# Patient Record
Sex: MALE | Race: WHITE | NOT HISPANIC OR LATINO | Employment: OTHER | URBAN - METROPOLITAN AREA
[De-identification: names, ages, dates, MRNs, and addresses within clinical notes are randomized per-mention and may not be internally consistent; named-entity substitution may affect disease eponyms.]

---

## 2017-01-11 ENCOUNTER — APPOINTMENT (OUTPATIENT)
Dept: GENERAL RADIOLOGY | Facility: HOSPITAL | Age: 72
End: 2017-01-11

## 2017-01-11 ENCOUNTER — HOSPITAL ENCOUNTER (EMERGENCY)
Facility: HOSPITAL | Age: 72
Discharge: HOME | End: 2017-01-11
Attending: EMERGENCY MEDICINE | Admitting: EMERGENCY MEDICINE

## 2017-01-11 ENCOUNTER — APPOINTMENT (OUTPATIENT)
Dept: CT IMAGING | Facility: HOSPITAL | Age: 72
End: 2017-01-11

## 2017-01-11 VITALS
DIASTOLIC BLOOD PRESSURE: 88 MMHG | RESPIRATION RATE: 16 BRPM | HEART RATE: 94 BPM | HEIGHT: 69 IN | TEMPERATURE: 98.9 F | BODY MASS INDEX: 25.18 KG/M2 | OXYGEN SATURATION: 99 % | WEIGHT: 170 LBS | SYSTOLIC BLOOD PRESSURE: 172 MMHG

## 2017-01-11 DIAGNOSIS — S40.812A ABRASION OF LEFT ARM, INITIAL ENCOUNTER: ICD-10-CM

## 2017-01-11 DIAGNOSIS — S09.90XA HEAD INJURY, INITIAL ENCOUNTER: ICD-10-CM

## 2017-01-11 DIAGNOSIS — S70.02XA CONTUSION OF LEFT HIP, INITIAL ENCOUNTER: ICD-10-CM
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
## 2017-01-11 DIAGNOSIS — S80.02XA CONTUSION OF LEFT KNEE, INITIAL ENCOUNTER: ICD-10-CM

## 2017-01-11 DIAGNOSIS — S90.31XA CONTUSION OF RIGHT FOOT, INITIAL ENCOUNTER: ICD-10-CM

## 2017-01-11 DIAGNOSIS — S40.212A: ICD-10-CM

## 2017-01-11 PROCEDURE — 73562 X-RAY EXAM OF KNEE 3: CPT

## 2017-01-11 PROCEDURE — 99282 EMERGENCY DEPT VISIT SF MDM: CPT

## 2017-01-11 PROCEDURE — 25010000002 TDAP 5-2.5-18.5 LF-MCG/0.5 SUSPENSION: Performed by: EMERGENCY MEDICINE

## 2017-01-11 PROCEDURE — 73630 X-RAY EXAM OF FOOT: CPT

## 2017-01-11 PROCEDURE — 73502 X-RAY EXAM HIP UNI 2-3 VIEWS: CPT

## 2017-01-11 PROCEDURE — 90715 TDAP VACCINE 7 YRS/> IM: CPT | Performed by: EMERGENCY MEDICINE

## 2017-01-11 PROCEDURE — 72125 CT NECK SPINE W/O DYE: CPT

## 2017-01-11 PROCEDURE — 90471 IMMUNIZATION ADMIN: CPT | Performed by: EMERGENCY MEDICINE

## 2017-01-11 PROCEDURE — 70450 CT HEAD/BRAIN W/O DYE: CPT

## 2017-01-11 RX ORDER — SILDENAFIL 100 MG/1
100 TABLET, FILM COATED ORAL DAILY PRN
COMMUNITY
End: 2018-04-25

## 2017-01-11 RX ORDER — SERTRALINE HYDROCHLORIDE 100 MG/1
100 TABLET, FILM COATED ORAL DAILY
COMMUNITY
End: 2017-03-28 | Stop reason: SDUPTHER

## 2017-01-11 RX ORDER — MODAFINIL 100 MG/1
100 TABLET ORAL DAILY
COMMUNITY
End: 2017-03-28 | Stop reason: SDUPTHER

## 2017-01-11 RX ORDER — MELATONIN
1000 DAILY
COMMUNITY
End: 2019-05-22

## 2017-01-11 RX ORDER — LISINOPRIL 10 MG/1
10 TABLET ORAL DAILY
COMMUNITY
End: 2017-03-28 | Stop reason: SDUPTHER

## 2017-01-11 RX ORDER — TAMSULOSIN HYDROCHLORIDE 0.4 MG/1
1 CAPSULE ORAL DAILY
COMMUNITY
End: 2018-07-05 | Stop reason: SDUPTHER

## 2017-01-11 RX ORDER — INDOMETHACIN 50 MG/1
50 CAPSULE ORAL 3 TIMES DAILY PRN
COMMUNITY
End: 2017-03-28 | Stop reason: SDUPTHER

## 2017-01-11 RX ORDER — ALLOPURINOL 100 MG/1
100 TABLET ORAL DAILY
COMMUNITY
End: 2017-03-28 | Stop reason: SDUPTHER

## 2017-01-11 RX ORDER — HYDROCODONE BITARTRATE AND ACETAMINOPHEN 5; 325 MG/1; MG/1
1 TABLET ORAL EVERY 6 HOURS PRN
Qty: 15 TABLET | Refills: 0 | Status: SHIPPED | OUTPATIENT
Start: 2017-01-11 | End: 2017-03-28

## 2017-01-11 RX ADMIN — TETANUS TOXOID, REDUCED DIPHTHERIA TOXOID AND ACELLULAR PERTUSSIS VACCINE, ADSORBED 0.5 ML: 5; 2.5; 8; 8; 2.5 SUSPENSION INTRAMUSCULAR at 15:21

## 2017-01-11 NOTE — ED NOTES
Patient states he is on a new drug for narcolepsy, and states he got up in the middle of the night, and tried to go down the basement steps, and fell down them. Patient states he has left hip pain, and left big toe pain, right knee pain, and abrasions to his left upper arm. Patient has had gout in the past. Patient does not recall hitting his head.      Cheyanne Lam RN  01/11/17 0062

## 2017-01-15 ENCOUNTER — TELEPHONE (OUTPATIENT)
Dept: SOCIAL WORK | Facility: HOSPITAL | Age: 72
End: 2017-01-15

## 2017-02-08 NOTE — ED PROVIDER NOTES
EMERGENCY DEPARTMENT ENCOUNTER    CHIEF COMPLAINT  Chief Complaint: Fall  History given by: Patient  History limited by: la  Room Number: HALC/C  PMD: Robby Henley MD    HPI:  Pt is a 71 y.o. male who presents after a fall in the night last night where the patient awoke in the night to go to the bathroom but fell down 10 wooded steps in his basement. Denies blow to the head or LOC in the fall. He has been ambulatory since the fall. Patient reports he was disoriented by his new house and opened the door to the basement instead of the bathroom causing him to fall. He also feels the Xyrem he has taken for his narcolepsy for the past 10 years may have contributed to his disorientation. He reports it is not abnormal for him to be up in the night and be mildly disoriented. He does not take Ambien or other sleep medication. Presently he complains of mild left hip pain, right knee pain, mild neck pain, and left foot pain. No other complaints at this time. Tetanus needs updating - will be done today.    Patient is a 71 y.o. male presenting with fall.   Fall   Mechanism of injury: fall    Injury location:  Leg, foot and pelvis  Leg injury location:  R knee and L hip  Foot injury location:  L foot  Incident location:  Home  Time since incident:  12 hours  Arrived directly from scene: no    Fall:     Fall occurred:  Down stairs    Impact surface:  Stairs    Point of impact:  Unable to specify  Tetanus status:  Out of date  Prior to arrival data:     Bystander interventions:  None  Current pain details:     Pain quality:  Aching    Pain Severity:  Mild    Pain timing:  Constant  Associated symptoms: no abdominal pain, no back pain, no blindness, no chest pain, no difficulty breathing, no headaches, no hearing loss, no loss of consciousness, no nausea, no neck pain, no seizures and no vomiting          PAST MEDICAL HISTORY  Active Ambulatory Problems     Diagnosis Date Noted   • No Active Ambulatory Problems     Resolved  FOUR VIEW LEFT KNEE

2/8/17

 

INDICATION:

Left knee pain, injury.

 

FINDINGS:  

There is no fracture, dislocation or significant joint capsular distention.

 

IMPRESSION:  

No acute process.

 

POS: [facility] Ambulatory Problems     Diagnosis Date Noted   • No Resolved Ambulatory Problems     Past Medical History   Diagnosis Date   • Gout    • Hypertension    • Narcolepsy    • Prostate cancer        PAST SURGICAL HISTORY  Past Surgical History   Procedure Laterality Date   • Hernia repair     • Cholecystectomy     • Tonsillectomy         FAMILY HISTORY  History reviewed. No pertinent family history.    SOCIAL HISTORY  Social History     Social History   • Marital status:      Spouse name: N/A   • Number of children: N/A   • Years of education: N/A     Occupational History   • Not on file.     Social History Main Topics   • Smoking status: Former Smoker   • Smokeless tobacco: Not on file   • Alcohol use 8.4 oz/week     14 Shots of liquor per week   • Drug use: No   • Sexual activity: Not on file     Other Topics Concern   • Not on file     Social History Narrative   • No narrative on file       ALLERGIES  Review of patient's allergies indicates no known allergies.    REVIEW OF SYSTEMS  Review of Systems   Constitutional: Negative for chills and fever.   HENT: Negative for hearing loss and sore throat.    Eyes: Negative for blindness.   Cardiovascular: Negative for chest pain.   Gastrointestinal: Negative for abdominal pain, nausea and vomiting.   Genitourinary: Negative for dysuria.   Musculoskeletal: Negative for back pain and neck pain.        Musculoskeletal pain as in HPI   Skin: Negative for rash.   Neurological: Negative for seizures, loss of consciousness and headaches.   Psychiatric/Behavioral: The patient is not nervous/anxious.        PHYSICAL EXAM  ED Triage Vitals   Temp Heart Rate Resp BP SpO2   01/11/17 1147 01/11/17 1147 01/11/17 1147 01/11/17 1159 01/11/17 1147   98.8 °F (37.1 °C) 86 18 181/97 98 %      Temp src Heart Rate Source Patient Position BP Location FiO2 (%)   01/11/17 1147 01/11/17 1147 -- -- --   Tympanic Monitor          Physical Exam   Constitutional: He is oriented to person, place,  and time and well-developed, well-nourished, and in no distress.   HENT:   Head: Normocephalic and atraumatic.   Eyes: Pupils are equal, round, and reactive to light.   Neck: Normal range of motion. Neck supple.   Cardiovascular: Normal rate and regular rhythm.    Pulmonary/Chest: Effort normal. No respiratory distress.   Musculoskeletal: Normal range of motion.        Left shoulder: He exhibits normal range of motion and no tenderness.        Right hip: Normal.        Left hip: He exhibits tenderness (mild). He exhibits normal range of motion and normal strength.        Right knee: He exhibits normal range of motion and no swelling. Tenderness (mild) found.        Left knee: Normal.        Cervical back: Normal.        Thoracic back: Normal.        Lumbar back: Normal.        Left foot: There is tenderness (mild tenderness of the left great toe).   Neurological: He is alert and oriented to person, place, and time.   Skin: Skin is warm and dry. Bruising (left foot, right knee) noted.   There are small skin tears to his left shoulder, upper arm, and left elbow   Psychiatric: Mood and affect normal.   Nursing note and vitals reviewed.      LAB RESULTS  No results found for this or any previous visit (from the past 24 hour(s)).  I ordered the above labs and reviewed the results    RADIOLOGY  XR Foot 3+ View Left   Preliminary Result   Negative x-rays of the right knee and left foot.          XR Knee 3 View Right   Preliminary Result   Negative x-rays of the right knee and left foot.          XR Hip With or Without Pelvis 2 - 3 View Left    (Results Pending)  Negative Acute. See dictated report for further.      CT Head Without Contrast  Negative Acute. See dictated report for further.      CT Cervical Spine Without Contrast  Degenerative changes, negative Acute. See dictated report for further.         I ordered the above noted radiological studies. Interpreted by radiologist. Reviewed by me in PACS.      PROCEDURES  Procedures      PROGRESS AND CONSULTS  ED Course   Comment By Time   2:49 PM  Patient with fall.  Is in a new house and opened basement door instead of bathroom door.  Fell down steps.  CT head and neck negative.  Xrays normal.  Will discharge home.  Dress wounds.  Follow up with PMD.  Asking for pain meds.  Needs to be very careful with these.  States he is putting a lock on that door. Royce Roche MD 01/11 [id number] - Will dress wounds, update Tetanus and discharge the patient with medication for pain. Treatment plan to include limited course of prescribed controlled substance.  Risks including addiction, tolerance, sedation, benefits and alternatives presented to patient.        MEDICAL DECISION MAKING  Results were reviewed/discussed with the patient and they were also made aware of online access. Pt also made aware that some labs, such as cultures, will not be resulted during ER visit and follow up with PMD is necessary.     MDM  Number of Diagnoses or Management Options  Abrasion of left arm, initial encounter:   Abrasion of shoulder, left, initial encounter:   Contusion of left hip, initial encounter:   Contusion of left knee, initial encounter:   Contusion of right foot, initial encounter:   Fall, initial encounter:   Head injury, initial encounter:      Amount and/or Complexity of Data Reviewed  Tests in the radiology section of CPT®: ordered and reviewed    Patient Progress  Patient progress: stable         DIAGNOSIS  Final diagnoses:   Fall, initial encounter   Abrasion of shoulder, left, initial encounter   Abrasion of left arm, initial encounter   Contusion of left hip, initial encounter   Contusion of left knee, initial encounter   Contusion of right foot, initial encounter   Head injury, initial encounter       DISPOSITION  DISCHARGE    Patient discharged in stable condition.    Reviewed implications of results, diagnosis, meds, responsibility to follow up, warning signs and  symptoms of possible worsening, potential complications and reasons to return to ER.    Patient/Family voiced understanding of above instructions.    Discussed plan for discharge, as there is no emergent indication for admission.  Pt/family is agreeable and understands need for follow up and repeat testing.  Pt is aware that discharge does not mean that nothing is wrong but it indicates no emergency is present that requires admission and they must continue care with follow-up as given below or physician of their choice.     FOLLOW-UP  Robby Henley MD  [address] [facility] [address] [phone]    Schedule an appointment as soon as possible for a visit           Medication List      New Prescriptions          HYDROcodone-acetaminophen 5-325 MG per tablet   Commonly known as:  NORCO   Take 1 tablet by mouth Every 6 (Six) Hours As Needed for mild pain (1-3).           Latest Documented Vital Signs:  As of 2:54 PM  BP- (!) 181/97 HR- 86 Temp- 98.8 °F (37.1 °C) (Tympanic) O2 sat- 98%    --  Documentation assistance provided by robin Bosch for Dr Roche.  Information recorded by the scribe was done at my direction and has been verified and validated by me.     Edward Bosch  01/11/17 1700       Royce Roche MD  01/11/17 7210

## 2017-03-28 ENCOUNTER — OFFICE VISIT (OUTPATIENT)
Dept: FAMILY MEDICINE CLINIC | Facility: CLINIC | Age: 72
End: 2017-03-28

## 2017-03-28 VITALS
TEMPERATURE: 98.3 F | DIASTOLIC BLOOD PRESSURE: 70 MMHG | OXYGEN SATURATION: 95 % | HEART RATE: 75 BPM | BODY MASS INDEX: 25.87 KG/M2 | SYSTOLIC BLOOD PRESSURE: 118 MMHG | HEIGHT: 69 IN | WEIGHT: 174.7 LBS

## 2017-03-28 DIAGNOSIS — F33.41 RECURRENT MAJOR DEPRESSIVE DISORDER, IN PARTIAL REMISSION (HCC): ICD-10-CM

## 2017-03-28 DIAGNOSIS — I10 ESSENTIAL HYPERTENSION: Primary | ICD-10-CM

## 2017-03-28 DIAGNOSIS — E78.2 MIXED HYPERLIPIDEMIA: ICD-10-CM

## 2017-03-28 DIAGNOSIS — M1A.0710 CHRONIC IDIOPATHIC GOUT INVOLVING TOE OF RIGHT FOOT WITHOUT TOPHUS: ICD-10-CM

## 2017-03-28 DIAGNOSIS — G47.411 PRIMARY NARCOLEPSY WITH CATAPLEXY: ICD-10-CM

## 2017-03-28 PROBLEM — G47.33 OBSTRUCTIVE SLEEP APNEA SYNDROME: Status: ACTIVE | Noted: 2017-03-28

## 2017-03-28 PROBLEM — G47.30 SLEEP APNEA: Status: ACTIVE | Noted: 2017-03-28

## 2017-03-28 PROCEDURE — 99204 OFFICE O/P NEW MOD 45 MIN: CPT | Performed by: FAMILY MEDICINE

## 2017-03-28 RX ORDER — CHLORAL HYDRATE 500 MG
CAPSULE ORAL
COMMUNITY
End: 2021-08-31

## 2017-03-28 RX ORDER — LISINOPRIL 10 MG/1
10 TABLET ORAL DAILY
Qty: 90 TABLET | Refills: 3 | Status: SHIPPED | OUTPATIENT
Start: 2017-03-28 | End: 2018-04-25 | Stop reason: SDUPTHER

## 2017-03-28 RX ORDER — SERTRALINE HYDROCHLORIDE 100 MG/1
100 TABLET, FILM COATED ORAL DAILY
Qty: 90 TABLET | Refills: 3 | Status: SHIPPED | OUTPATIENT
Start: 2017-03-28 | End: 2018-03-23 | Stop reason: SDUPTHER

## 2017-03-28 RX ORDER — SODIUM OXYBATE 0.5 G/ML
SOLUTION ORAL
Qty: 3 BOTTLE | Refills: 1 | Status: SHIPPED | OUTPATIENT
Start: 2017-03-28 | End: 2021-08-31

## 2017-03-28 RX ORDER — MODAFINIL 100 MG/1
100 TABLET ORAL EVERY MORNING
Qty: 90 TABLET | Refills: 1 | Status: SHIPPED | OUTPATIENT
Start: 2017-03-28 | End: 2018-03-26 | Stop reason: DRUGHIGH

## 2017-03-28 RX ORDER — ALLOPURINOL 100 MG/1
200 TABLET ORAL DAILY
Qty: 180 TABLET | Refills: 3 | Status: SHIPPED | OUTPATIENT
Start: 2017-03-28 | End: 2018-04-22 | Stop reason: SDUPTHER

## 2017-03-28 RX ORDER — INDOMETHACIN 50 MG/1
50 CAPSULE ORAL 3 TIMES DAILY PRN
Qty: 30 CAPSULE | Refills: 1 | Status: SHIPPED | OUTPATIENT
Start: 2017-03-28 | End: 2019-04-22 | Stop reason: SDUPTHER

## 2017-03-28 NOTE — PROGRESS NOTES
Subjective   Jim Frances is a 71 y.o. male who is here for   Chief Complaint   Patient presents with   • Cancer     prostate cancer    • Gout   • Depression   • Hyperlipidemia   .     History of Present Illness   Hypertension: Patient here for follow-up of elevated blood pressure. He is not exercising and is adherent to low salt diet.  Blood pressure is well controlled at home. Cardiac symptoms fatigue. Patient denies chest pressure/discomfort, claudication, cough, dyspnea, exertional chest pressure/discomfort, irregular heart beat and lower extremity edema.  Cardiovascular risk factors: advanced age (older than 55 for men, 65 for women), hypertension and male gender. Use of agents associated with hypertension: amphetamines. History of target organ damage: none  A new patient here  Originally from [place]  He moved to [place].  Moved back last year  Retired   He cares for his disabled wife  Is a prostate cancer survivor here local with Dr Galeana  But cancer has come back    Also with Narcolepsy  On Xyrem at night and Provigil i am  He has already re est with his previous sleep med MD.    Needs refills.  Uses mail order  I will fill his narcolepsy meds for now.    No recent gout flairs      The following portions of the patient's history were reviewed and updated as appropriate: allergies, current medications, past family history, past medical history, past social history, past surgical history and problem list.    Review of Systems    Objective   Physical Exam   Constitutional: He appears well-developed and well-nourished.   HENT:   Mouth/Throat: Oropharynx is clear and moist.   Cardiovascular: Normal rate.    Pulmonary/Chest: Effort normal.   Neurological: He is alert.   Psychiatric: He has a normal mood and affect.   Nursing note and vitals reviewed.      Assessment/Plan   Jim was seen today for cancer, gout, depression and hyperlipidemia.    Diagnoses and all orders for this  visit:    Essential hypertension  -     lisinopril (PRINIVIL,ZESTRIL) 10 MG tablet; Take 1 tablet by mouth Daily. Indications: High Blood Pressure    Mixed hyperlipidemia    Recurrent major depressive disorder, in partial remission  -     sertraline (ZOLOFT) 100 MG tablet; Take 1 tablet by mouth Daily. Indications: Major Depressive Disorder    Chronic idiopathic gout involving toe of right foot without tophus  -     allopurinol (ZYLOPRIM) 100 MG tablet; Take 2 tablets by mouth Daily. Indications: Primary Gout  -     indomethacin (INDOCIN) 50 MG capsule; Take 1 capsule by mouth 3 (Three) Times a Day As Needed for Mild Pain (1-3). Indications: Acute Joint Inflammation in Gout    Primary narcolepsy with cataplexy  -     modafinil (PROVIGIL) 100 MG tablet; Take 1 tablet by mouth Every Morning.  -     Sodium Oxybate (XYREM) 500 MG/ML solution; 4.5 gm diluted in 60 ml of water at 11pm and 2am.      There are no Patient Instructions on file for this visit.    Medications Discontinued During This Encounter   Medication Reason   • HYDROcodone-acetaminophen (NORCO) 5-325 MG per tablet    • lisinopril (PRINIVIL,ZESTRIL) 10 MG tablet Reorder   • sertraline (ZOLOFT) 100 MG tablet Reorder   • allopurinol (ZYLOPRIM) 100 MG tablet Reorder   • modafinil (PROVIGIL) 100 MG tablet Reorder   • Sodium Oxybate (XYREM PO) Reorder   • indomethacin (INDOCIN) 50 MG capsule Reorder        Return in about 6 months (around 9/28/2017).    Dr. Robby Henley  [place], [place]

## 2017-03-30 ENCOUNTER — TELEPHONE (OUTPATIENT)
Dept: FAMILY MEDICINE CLINIC | Facility: CLINIC | Age: 72
End: 2017-03-30

## 2017-03-30 NOTE — TELEPHONE ENCOUNTER
pt called stating that his xyrem Rx needed to be called into [facility] specialty pharmacy, it was escribed to the wrong place and did not go threw. He gave me a phone number to call directly.   [phone]  I called and spoke with a pharmacist named Karen and she stated she could not take a verbal over the phone because they need a form filled out by the provider before hand. She will be faxing the form with moreinstructions on escribing assistance. I will also add the specialty pharmacy so we don't run into this issue anymore in the future. * it is under [facility] pharmacy. For future refills.

## 2017-06-16 ENCOUNTER — CLINICAL SUPPORT (OUTPATIENT)
Dept: FAMILY MEDICINE CLINIC | Facility: CLINIC | Age: 72
End: 2017-06-16

## 2017-06-16 DIAGNOSIS — R82.90 ABNORMAL FINDING IN URINE: ICD-10-CM

## 2017-06-16 DIAGNOSIS — Z01.818 PREOPERATIVE CLEARANCE: ICD-10-CM

## 2017-06-16 DIAGNOSIS — R79.1 ABNORMAL COAGULATION PROFILE: ICD-10-CM

## 2017-06-16 DIAGNOSIS — I10 ESSENTIAL HYPERTENSION: ICD-10-CM

## 2017-06-16 DIAGNOSIS — C61 PROSTATE CANCER (HCC): Primary | ICD-10-CM

## 2017-06-18 LAB
APPEARANCE UR: CLEAR
BACTERIA UR CULT: NO GROWTH
BACTERIA UR CULT: NORMAL
BILIRUB UR QL STRIP: NEGATIVE
BUN SERPL-MCNC: 19 MG/DL (ref 8–23)
BUN/CREAT SERPL: 17.4 (ref 7–25)
CALCIUM SERPL-MCNC: 9.2 MG/DL (ref 8.8–10.5)
CHLORIDE SERPL-SCNC: 100 MMOL/L (ref 98–107)
CO2 SERPL-SCNC: 31.5 MMOL/L (ref 22–29)
COLOR UR: YELLOW
CONV COMMENT: NORMAL
CREAT SERPL-MCNC: 1.09 MG/DL (ref 0.76–1.27)
ERYTHROCYTE [DISTWIDTH] IN BLOOD BY AUTOMATED COUNT: 13 % (ref 11.5–14.5)
GLUCOSE SERPL-MCNC: 99 MG/DL (ref 65–99)
GLUCOSE UR QL: NEGATIVE
HCT VFR BLD AUTO: 39.9 % (ref 42–52)
HGB BLD-MCNC: 13.2 G/DL (ref 14–18)
HGB UR QL STRIP: NEGATIVE
INR PPP: 0.98 (ref 0.9–1.1)
KETONES UR QL STRIP: NEGATIVE
LEUKOCYTE ESTERASE UR QL STRIP: NEGATIVE
MCH RBC QN AUTO: 30.6 PG (ref 27–31)
MCHC RBC AUTO-ENTMCNC: 33.1 G/DL (ref 31–37)
MCV RBC AUTO: 92.6 FL (ref 80–94)
NITRITE UR QL STRIP: NEGATIVE
PH UR STRIP: 7.5 [PH] (ref 4.5–8)
PLATELET # BLD AUTO: 180 10*3/MM3 (ref 140–500)
POTASSIUM SERPL-SCNC: 4.6 MMOL/L (ref 3.5–5.2)
PROT UR QL STRIP: NEGATIVE
PROTHROMBIN TIME: 13 SECONDS (ref 12.1–15)
PSA SERPL-MCNC: 4.54 NG/ML (ref 0–4)
RBC # BLD AUTO: 4.31 10*6/MM3 (ref 4.7–6.1)
SODIUM SERPL-SCNC: 144 MMOL/L (ref 136–145)
SP GR UR: 1.02 (ref 1–1.03)
TESTOST FREE SERPL-MCNC: 10.2 PG/ML (ref 6.6–18.1)
TESTOST SERPL-MCNC: 418 NG/DL (ref 348–1197)
UROBILINOGEN UR STRIP-MCNC: NORMAL MG/DL
WBC # BLD AUTO: 4.13 10*3/MM3 (ref 4.8–10.8)

## 2017-08-16 ENCOUNTER — HOSPITAL ENCOUNTER (EMERGENCY)
Facility: HOSPITAL | Age: 72
Discharge: HOME | End: 2017-08-16
Attending: EMERGENCY MEDICINE | Admitting: EMERGENCY MEDICINE

## 2017-08-16 VITALS
WEIGHT: 170 LBS | BODY MASS INDEX: 25.18 KG/M2 | RESPIRATION RATE: 18 BRPM | DIASTOLIC BLOOD PRESSURE: 70 MMHG | OXYGEN SATURATION: 97 % | TEMPERATURE: 97.6 F | HEIGHT: 69 IN | SYSTOLIC BLOOD PRESSURE: 120 MMHG | HEART RATE: 50 BPM

## 2017-08-16 DIAGNOSIS — R33.8 ACUTE URINARY RETENTION: Primary | ICD-10-CM

## 2017-08-16 LAB
ALBUMIN SERPL-MCNC: 4.3 G/DL (ref 3.5–5.2)
ALBUMIN/GLOB SERPL: 1.1 G/DL
ALP SERPL-CCNC: 81 U/L (ref 39–117)
ALT SERPL W P-5'-P-CCNC: 14 U/L (ref 1–41)
ANION GAP SERPL CALCULATED.3IONS-SCNC: 13.1 MMOL/L
AST SERPL-CCNC: 19 U/L (ref 1–40)
BASOPHILS # BLD AUTO: 0.02 10*3/MM3 (ref 0–0.2)
BASOPHILS NFR BLD AUTO: 0.2 % (ref 0–1.5)
BILIRUB SERPL-MCNC: 0.5 MG/DL (ref 0.1–1.2)
BUN BLD-MCNC: 25 MG/DL (ref 8–23)
BUN/CREAT SERPL: 25.8 (ref 7–25)
CALCIUM SPEC-SCNC: 9.9 MG/DL (ref 8.6–10.5)
CHLORIDE SERPL-SCNC: 100 MMOL/L (ref 98–107)
CO2 SERPL-SCNC: 28.9 MMOL/L (ref 22–29)
CREAT BLD-MCNC: 0.97 MG/DL (ref 0.76–1.27)
DEPRECATED RDW RBC AUTO: 42.5 FL (ref 37–54)
EOSINOPHIL # BLD AUTO: 0.06 10*3/MM3 (ref 0–0.7)
EOSINOPHIL NFR BLD AUTO: 0.6 % (ref 0.3–6.2)
ERYTHROCYTE [DISTWIDTH] IN BLOOD BY AUTOMATED COUNT: 12.3 % (ref 11.5–14.5)
GFR SERPL CREATININE-BSD FRML MDRD: 76 ML/MIN/1.73
GLOBULIN UR ELPH-MCNC: 3.9 GM/DL
GLUCOSE BLD-MCNC: 122 MG/DL (ref 65–99)
HCT VFR BLD AUTO: 36.6 % (ref 40.4–52.2)
HGB BLD-MCNC: 11.9 G/DL (ref 13.7–17.6)
IMM GRANULOCYTES # BLD: 0 10*3/MM3 (ref 0–0.03)
IMM GRANULOCYTES NFR BLD: 0 % (ref 0–0.5)
LYMPHOCYTES # BLD AUTO: 1.92 10*3/MM3 (ref 0.9–4.8)
LYMPHOCYTES NFR BLD AUTO: 19.7 % (ref 19.6–45.3)
MCH RBC QN AUTO: 31.2 PG (ref 27–32.7)
MCHC RBC AUTO-ENTMCNC: 32.5 G/DL (ref 32.6–36.4)
MCV RBC AUTO: 95.8 FL (ref 79.8–96.2)
MONOCYTES # BLD AUTO: 0.69 10*3/MM3 (ref 0.2–1.2)
MONOCYTES NFR BLD AUTO: 7.1 % (ref 5–12)
NEUTROPHILS # BLD AUTO: 7.05 10*3/MM3 (ref 1.9–8.1)
NEUTROPHILS NFR BLD AUTO: 72.4 % (ref 42.7–76)
PLATELET # BLD AUTO: 234 10*3/MM3 (ref 140–500)
PMV BLD AUTO: 9.9 FL (ref 6–12)
POTASSIUM BLD-SCNC: 4.1 MMOL/L (ref 3.5–5.2)
PROT SERPL-MCNC: 8.2 G/DL (ref 6–8.5)
RBC # BLD AUTO: 3.82 10*6/MM3 (ref 4.6–6)
SODIUM BLD-SCNC: 142 MMOL/L (ref 136–145)
WBC NRBC COR # BLD: 9.74 10*3/MM3 (ref 4.5–10.7)

## 2017-08-16 PROCEDURE — 80053 COMPREHEN METABOLIC PANEL: CPT | Performed by: EMERGENCY MEDICINE

## 2017-08-16 PROCEDURE — 85025 COMPLETE CBC W/AUTO DIFF WBC: CPT | Performed by: EMERGENCY MEDICINE

## 2017-08-16 PROCEDURE — 96365 THER/PROPH/DIAG IV INF INIT: CPT

## 2017-08-16 PROCEDURE — 25010000002 MORPHINE PER 10 MG: Performed by: EMERGENCY MEDICINE

## 2017-08-16 PROCEDURE — 96375 TX/PRO/DX INJ NEW DRUG ADDON: CPT

## 2017-08-16 PROCEDURE — 99284 EMERGENCY DEPT VISIT MOD MDM: CPT

## 2017-08-16 PROCEDURE — 25010000002 CEFTRIAXONE PER 250 MG: Performed by: UROLOGY

## 2017-08-16 PROCEDURE — 25010000002 ONDANSETRON PER 1 MG: Performed by: EMERGENCY MEDICINE

## 2017-08-16 PROCEDURE — 51702 INSERT TEMP BLADDER CATH: CPT

## 2017-08-16 RX ORDER — SILODOSIN 4 MG/1
8 CAPSULE ORAL
COMMUNITY
End: 2017-12-08 | Stop reason: ALTCHOICE

## 2017-08-16 RX ORDER — CEFTRIAXONE SODIUM 1 G/50ML
1 INJECTION, SOLUTION INTRAVENOUS ONCE
Status: COMPLETED | OUTPATIENT
Start: 2017-08-16 | End: 2017-08-16

## 2017-08-16 RX ORDER — SODIUM CHLORIDE 0.9 % (FLUSH) 0.9 %
10 SYRINGE (ML) INJECTION AS NEEDED
Status: DISCONTINUED | OUTPATIENT
Start: 2017-08-16 | End: 2017-08-16 | Stop reason: HOSPADM

## 2017-08-16 RX ORDER — FESOTERODINE FUMARATE 4 MG/1
4 TABLET, EXTENDED RELEASE ORAL DAILY
COMMUNITY
End: 2018-04-25

## 2017-08-16 RX ORDER — METHENAMINE, SODIUM PHOSPHATE, MONOBASIC, MONOHYDRATE, PHENYL SALICYLATE, METHYLENE BLUE, AND HYOSCYAMINE SULFATE 120; 40.8; 36; 10; .12 MG/1; MG/1; MG/1; MG/1; MG/1
118 CAPSULE ORAL 2 TIMES DAILY
COMMUNITY
End: 2018-04-25

## 2017-08-16 RX ORDER — ONDANSETRON 2 MG/ML
4 INJECTION INTRAMUSCULAR; INTRAVENOUS ONCE
Status: COMPLETED | OUTPATIENT
Start: 2017-08-16 | End: 2017-08-16

## 2017-08-16 RX ADMIN — LIDOCAINE HYDROCHLORIDE: 20 JELLY TOPICAL at 05:43

## 2017-08-16 RX ADMIN — MORPHINE SULFATE 4 MG: 4 INJECTION, SOLUTION INTRAMUSCULAR; INTRAVENOUS at 06:21

## 2017-08-16 RX ADMIN — CEFTRIAXONE SODIUM 1 G: 1 INJECTION, SOLUTION INTRAVENOUS at 07:18

## 2017-08-16 RX ADMIN — ONDANSETRON 4 MG: 2 INJECTION INTRAMUSCULAR; INTRAVENOUS at 06:21

## 2017-08-16 NOTE — ED PROVIDER NOTES
EMERGENCY DEPARTMENT ENCOUNTER    CHIEF COMPLAINT  Chief Complaint: urinary retention  History given by: pt  History limited by: none  Room Number: 31/31  PMD: Robby Henley MD      HPI:  Pt is a 71 y.o. male with a h/o prostate cancer who presents complaining of urinary retention for 24 hours. Pt had a HIFU on 06/28 and was sent home with a catheter. Pt was able to produce urine frequently but it was just a scant amount. On reevaluation, pt was instructed to self-cath but sx worsened during this time.      He does not complain of other sx at this time. Pt drinks about 2 drinks a day and smoke 1 cigar a week.     Duration:  24 hours  Onset: gradual  Timing: constant  Intensity/Severity: moderate  Progression: unchanged  Associated Symptoms: none stated  Aggravating Factors: none stated  Alleviating Factors: none stated  Previous Episodes: Pt has a h/o prostate cancer  Treatment before arrival: Pt uses a catheter    PAST MEDICAL HISTORY  Active Ambulatory Problems     Diagnosis Date Noted   • Prostate cancer    • Narcolepsy    • Essential hypertension    • Chronic idiopathic gout involving toe of right foot    • Recurrent major depressive disorder, in partial remission 03/28/2017   • Mixed hyperlipidemia 03/28/2017   • Obstructive sleep apnea syndrome 03/28/2017     Resolved Ambulatory Problems     Diagnosis Date Noted   • No Resolved Ambulatory Problems     Past Medical History:   Diagnosis Date   • Gout    • Hypertension    • Narcolepsy    • Prostate cancer        PAST SURGICAL HISTORY  Past Surgical History:   Procedure Laterality Date   • CHOLECYSTECTOMY     • HERNIA REPAIR     • PROSTATE SURGERY     • TONSILLECTOMY         FAMILY HISTORY  Family History   Problem Relation Age of Onset   • Heart disease Mother    • Diabetes Mother    • Hypertension Mother    • Arthritis Mother    • Stroke Father    • Alcohol abuse Father    • Cancer Brother    • Leukemia Brother        SOCIAL HISTORY  Social History      Social History   • Marital status:      Spouse name: N/A   • Number of children: 1   • Years of education: N/A     Occupational History   •       Social History Main Topics   • Smoking status: Light Tobacco Smoker     Types: Cigars   • Smokeless tobacco: Not on file      Comment: 1 per mo   • Alcohol use 8.4 oz/week     14 Shots of liquor per week      Comment: 2 drinks a day    • Drug use: No   • Sexual activity: Not Currently     Partners: Female     Other Topics Concern   • Not on file     Social History Narrative   • No narrative on file       ALLERGIES  Review of patient's allergies indicates no known allergies.    REVIEW OF SYSTEMS  Review of Systems   Constitutional: Negative.    Cardiovascular: Negative.    Gastrointestinal: Negative.    Genitourinary: Positive for decreased urine volume and difficulty urinating. Negative for dysuria and urgency.       PHYSICAL EXAM  ED Triage Vitals   Temp Heart Rate Resp BP SpO2   08/16/17 0233 08/16/17 0233 08/16/17 0233 08/16/17 0308 08/16/17 0233   97.6 °F (36.4 °C) 101 16 134/73 100 %      Temp src Heart Rate Source Patient Position BP Location FiO2 (%)   08/16/17 0233 -- 08/16/17 0308 08/16/17 0308 --   Tympanic  Standing Right arm        Physical Exam   Constitutional: He is oriented to person, place, and time. He appears distressed (moderate).   Pt is in obvious pain   HENT:   Head: Normocephalic and atraumatic.   Cardiovascular: Normal rate and regular rhythm.    Pulmonary/Chest: Effort normal and breath sounds normal. No respiratory distress.   Lungs CTAB   Abdominal: Soft. Bowel sounds are normal. He exhibits distension (bladder up until umbilicus).   Genitourinary:   Genitourinary Comments: Pt is circumcised with descended testicles. There is no blood in the meatus.    Musculoskeletal: He exhibits no edema.   Neurological: He is alert and oriented to person, place, and time.   Skin: Skin is warm and dry.       LAB RESULTS  Lab  Results (last 24 hours)     Procedure Component Value Units Date/Time    CBC & Differential [[id number]] Collected:  08/16/17 0550    Specimen:  Blood Updated:  08/16/17 0601    Narrative:       The following orders were created for panel order CBC & Differential.  Procedure                               Abnormality         Status                     ---------                               -----------         ------                     CBC Auto Differential[[id number]]        Abnormal            Final result                 Please view results for these tests on the individual orders.    Comprehensive Metabolic Panel [[id number]]  (Abnormal) Collected:  08/16/17 0550    Specimen:  Blood Updated:  08/16/17 0619     Glucose 122 (H) mg/dL      BUN 25 (H) mg/dL      Creatinine 0.97 mg/dL      Sodium 142 mmol/L      Potassium 4.1 mmol/L      Chloride 100 mmol/L      CO2 28.9 mmol/L      Calcium 9.9 mg/dL      Total Protein 8.2 g/dL      Albumin 4.30 g/dL      ALT (SGPT) 14 U/L      AST (SGOT) 19 U/L      Alkaline Phosphatase 81 U/L      Total Bilirubin 0.5 mg/dL      eGFR Non African Amer 76 mL/min/1.73      Globulin 3.9 gm/dL      A/G Ratio 1.1 g/dL      BUN/Creatinine Ratio 25.8 (H)     Anion Gap 13.1 mmol/L     Narrative:       The MDRD GFR formula is only valid for adults with stable renal function between ages 18 and 70.    CBC Auto Differential [[id number]]  (Abnormal) Collected:  08/16/17 0550    Specimen:  Blood Updated:  08/16/17 0601     WBC 9.74 10*3/mm3      RBC 3.82 (L) 10*6/mm3      Hemoglobin 11.9 (L) g/dL      Hematocrit 36.6 (L) %      MCV 95.8 fL      MCH 31.2 pg      MCHC 32.5 (L) g/dL      RDW 12.3 %      RDW-SD 42.5 fl      MPV 9.9 fL      Platelets 234 10*3/mm3      Neutrophil % 72.4 %      Lymphocyte % 19.7 %      Monocyte % 7.1 %      Eosinophil % 0.6 %      Basophil % 0.2 %      Immature Grans % 0.0 %      Neutrophils, Absolute 7.05 10*3/mm3      Lymphocytes, Absolute 1.92 10*3/mm3      Monocytes,  Absolute 0.69 10*3/mm3      Eosinophils, Absolute 0.06 10*3/mm3      Basophils, Absolute 0.02 10*3/mm3      Immature Grans, Absolute 0.00 10*3/mm3           I ordered the above labs and reviewed the results      PROCEDURES  Procedures      PROGRESS AND CONSULTS  ED Course   0518: Ordered UA for further evaluation.    0548: Ordered CBC and CMP per protocol. Ordered xylocaine for pain.     0615: Our nursing staff have attempted several suprapubic catheters including a 14 [place] without success. Ordered zofran for nausea and morphine for pain.    0625: Discussed patient's case with Dr. Laird, urologist , including concerns regarding pt's 24 hour urinary retention and lack of success with catheter insertion in the ER. He will let Dr. Miller (pt's urologist) know.     0641: Dr. Miller is here for consult.     0710: Dr Miller has successfully placed a poole after performing a cystoscopy.  He states that it is safe to discharge patient to home.     MEDICAL DECISION MAKING  Results were reviewed/discussed with the patient and they were also made aware of online access. Pt also made aware that some labs, such as cultures, will not be resulted during ER visit and follow up with PMD is necessary.     MDM  Number of Diagnoses or Management Options     Amount and/or Complexity of Data Reviewed  Clinical lab tests: ordered and reviewed (Lab results are unremarkable)  Decide to obtain previous medical records or to obtain history from someone other than the patient: yes  Review and summarize past medical records: yes    Patient Progress  Patient progress: stable         DIAGNOSIS  Final diagnoses:   Acute urinary retention       DISPOSITION  Home    Latest Documented Vital Signs:  As of 6:53 AM  BP- 166/40 HR- 50 Temp- 97.6 °F (36.4 °C) (Tympanic) O2 sat- 100%    --  Documentation assistance provided by robin Blanco for Dr. Peguero.  Information recorded by the robin was done at my direction and has been verified and  validated by me.     Silva Blanco  08/16/17 0658       Gaetano Peguero MD  08/16/17 0718

## 2017-08-16 NOTE — ED NOTES
Leg bag placed, education performed. Patient states he had one for more than two weeks and understands.      Josselyn Stout RN  08/16/17 0819

## 2017-08-16 NOTE — ED TRIAGE NOTES
Pt states he has been unable to urinate. Had HIFU procedure June 28th, 2017 and has been self cathing PRN. Patient's attempt to self cath was unsuccessful with no urine return and believes there is blockage. Has had no relief and rates pain 10/10.

## 2017-08-16 NOTE — ED NOTES
Multiple attempts to insert poole cath, MD Peguero notified.     Itzel Avendano RN  08/16/17 6058

## 2017-08-17 ENCOUNTER — TELEPHONE (OUTPATIENT)
Dept: SOCIAL WORK | Facility: HOSPITAL | Age: 72
End: 2017-08-17

## 2017-09-20 DIAGNOSIS — C61 PROSTATE CANCER (HCC): Primary | ICD-10-CM

## 2017-09-20 DIAGNOSIS — E78.2 MIXED HYPERLIPIDEMIA: ICD-10-CM

## 2017-09-20 DIAGNOSIS — I10 ESSENTIAL HYPERTENSION: ICD-10-CM

## 2017-09-20 DIAGNOSIS — R53.83 FATIGUE, UNSPECIFIED TYPE: ICD-10-CM

## 2017-09-21 ENCOUNTER — CLINICAL SUPPORT (OUTPATIENT)
Dept: FAMILY MEDICINE CLINIC | Facility: CLINIC | Age: 72
End: 2017-09-21

## 2017-09-21 DIAGNOSIS — Z23 IMMUNIZATION DUE: Primary | ICD-10-CM

## 2017-09-21 LAB
ALBUMIN SERPL-MCNC: 4 G/DL (ref 3.5–5.2)
ALBUMIN/GLOB SERPL: 1.2 G/DL
ALP SERPL-CCNC: 90 U/L (ref 40–129)
ALT SERPL-CCNC: 10 U/L (ref 5–41)
APPEARANCE UR: ABNORMAL
AST SERPL-CCNC: 17 U/L (ref 5–40)
BACTERIA #/AREA URNS HPF: ABNORMAL /HPF
BILIRUB SERPL-MCNC: 0.4 MG/DL (ref 0.2–1.2)
BILIRUB UR QL STRIP: NEGATIVE
BUN SERPL-MCNC: 17 MG/DL (ref 8–23)
BUN/CREAT SERPL: 16.3 (ref 7–25)
CALCIUM SERPL-MCNC: 9.3 MG/DL (ref 8.8–10.5)
CHLORIDE SERPL-SCNC: 96 MMOL/L (ref 98–107)
CHOLEST SERPL-MCNC: 155 MG/DL (ref 0–200)
CO2 SERPL-SCNC: 31.6 MMOL/L (ref 22–29)
COLOR UR: YELLOW
CREAT SERPL-MCNC: 1.04 MG/DL (ref 0.76–1.27)
EPI CELLS #/AREA URNS HPF: ABNORMAL /HPF
ERYTHROCYTE [DISTWIDTH] IN BLOOD BY AUTOMATED COUNT: 13.6 % (ref 11.5–14.5)
GLOBULIN SER CALC-MCNC: 3.3 GM/DL
GLUCOSE SERPL-MCNC: 104 MG/DL (ref 65–99)
GLUCOSE UR QL: NEGATIVE
HCT VFR BLD AUTO: 37.1 % (ref 42–52)
HDLC SERPL-MCNC: 51 MG/DL (ref 40–60)
HGB BLD-MCNC: 11.9 G/DL (ref 14–18)
HGB UR QL STRIP: ABNORMAL
KETONES UR QL STRIP: NEGATIVE
LDLC SERPL CALC-MCNC: 81 MG/DL (ref 0–100)
LDLC/HDLC SERPL: 1.59 {RATIO}
LEUKOCYTE ESTERASE UR QL STRIP: ABNORMAL
MCH RBC QN AUTO: 30.4 PG (ref 27–31)
MCHC RBC AUTO-ENTMCNC: 32.1 G/DL (ref 31–37)
MCV RBC AUTO: 94.9 FL (ref 80–94)
NITRITE UR QL STRIP: POSITIVE
PH UR STRIP: 7 [PH] (ref 4.5–8)
PLATELET # BLD AUTO: 220 10*3/MM3 (ref 140–500)
POTASSIUM SERPL-SCNC: 4.4 MMOL/L (ref 3.5–5.2)
PROT SERPL-MCNC: 7.3 G/DL (ref 6–8.5)
PROT UR QL STRIP: ABNORMAL
PSA SERPL-MCNC: 0.05 NG/ML (ref 0–4)
RBC # BLD AUTO: 3.91 10*6/MM3 (ref 4.7–6.1)
RBC #/AREA URNS HPF: ABNORMAL /HPF
SODIUM SERPL-SCNC: 139 MMOL/L (ref 136–145)
SP GR UR: 1.02 (ref 1–1.03)
TRIGL SERPL-MCNC: 115 MG/DL (ref 0–150)
TSH SERPL DL<=0.005 MIU/L-ACNC: 2.04 MIU/ML (ref 0.27–4.2)
UROBILINOGEN UR STRIP-MCNC: ABNORMAL MG/DL
VLDLC SERPL CALC-MCNC: 23 MG/DL (ref 8–32)
WBC # BLD AUTO: 5.46 10*3/MM3 (ref 4.8–10.8)
WBC #/AREA URNS HPF: ABNORMAL /HPF

## 2017-09-21 PROCEDURE — G0008 ADMIN INFLUENZA VIRUS VAC: HCPCS | Performed by: FAMILY MEDICINE

## 2017-09-22 RX ORDER — CIPROFLOXACIN 500 MG/1
500 TABLET, FILM COATED ORAL 2 TIMES DAILY
Qty: 20 TABLET | Refills: 0 | Status: SHIPPED | OUTPATIENT
Start: 2017-09-22 | End: 2017-12-08

## 2017-09-28 ENCOUNTER — OFFICE VISIT (OUTPATIENT)
Dept: FAMILY MEDICINE CLINIC | Facility: CLINIC | Age: 72
End: 2017-09-28

## 2017-09-28 VITALS
HEART RATE: 64 BPM | BODY MASS INDEX: 24.14 KG/M2 | TEMPERATURE: 97.5 F | SYSTOLIC BLOOD PRESSURE: 140 MMHG | WEIGHT: 163 LBS | OXYGEN SATURATION: 97 % | DIASTOLIC BLOOD PRESSURE: 86 MMHG | HEIGHT: 69 IN

## 2017-09-28 DIAGNOSIS — I10 ESSENTIAL HYPERTENSION: ICD-10-CM

## 2017-09-28 DIAGNOSIS — F33.41 RECURRENT MAJOR DEPRESSIVE DISORDER, IN PARTIAL REMISSION (HCC): ICD-10-CM

## 2017-09-28 DIAGNOSIS — E78.2 MIXED HYPERLIPIDEMIA: ICD-10-CM

## 2017-09-28 DIAGNOSIS — M1A.0710 CHRONIC IDIOPATHIC GOUT INVOLVING TOE OF RIGHT FOOT WITHOUT TOPHUS: Primary | ICD-10-CM

## 2017-09-28 PROCEDURE — 99213 OFFICE O/P EST LOW 20 MIN: CPT | Performed by: FAMILY MEDICINE

## 2017-09-28 NOTE — PROGRESS NOTES
Chief Complaint   Patient presents with   • Follow-up     Lab results    • Hypertension   • Hyperlipidemia       Subjective     Patient here for follow-up of elevated blood pressure.    He is not exercising and is adherent to a low-salt diet.    Blood pressure is well controlled at home.   Cardiac symptoms: none.   Patient denies: chest pressure/discomfort, claudication, cough, dyspnea, exertional chest pressure/discomfort, fatigue, irregular heart beat and lower extremity edema.   Cardiovascular risk factors: advanced age (older than 55 for men, 65 for women), hypertension and male gender.   Use of agents associated with hypertension: amphetamines.   History of target organ damage: none.  Patient is taking prescribed hypertension medications as prescribed without side effects.    Having prostate surgery tomorrow  Having gross hematuria  On Cipro now  Has White cath inserted at this time.    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    The following portions of the patient's history were reviewed and updated as appropriate: allergies, current medications, past family history, past medical history, past social history, past surgical history and problem list.    Review of Systems  A comprehensive review of systems was negative except for: Constitutional: positive for fatigue and weight loss  Genitourinary: positive for sexual problems, decreased stream and hematuria    Results for orders placed or performed in visit on 09/20/17   PSA   Result Value Ref Range    PSA 0.046 0.000 - 4.000 ng/mL   Comprehensive metabolic panel   Result Value Ref Range    Glucose 104 (H) 65 - 99 mg/dL    BUN 17 8 - 23 mg/dL    Creatinine 1.04 0.76 - 1.27 mg/dL    eGFR Non African Am 70 >60 mL/min/1.73    eGFR African Am 85 >60 mL/min/1.73    BUN/Creatinine Ratio 16.3 7.0 - 25.0    Sodium 139 136 - 145 mmol/L    Potassium 4.4 3.5 - 5.2 mmol/L    Chloride 96 (L) 98 - 107  "mmol/L    Total CO2 31.6 (H) 22.0 - 29.0 mmol/L    Calcium 9.3 8.8 - 10.5 mg/dL    Total Protein 7.3 6.0 - 8.5 g/dL    Albumin 4.00 3.50 - 5.20 g/dL    Globulin 3.3 gm/dL    A/G Ratio 1.2 g/dL    Total Bilirubin 0.4 0.2 - 1.2 mg/dL    Alkaline Phosphatase 90 40 - 129 U/L    AST (SGOT) 17 5 - 40 U/L    ALT (SGPT) 10 5 - 41 U/L   CBC (No Diff)   Result Value Ref Range    WBC 5.46 4.80 - 10.80 10*3/mm3    RBC 3.91 (L) 4.70 - 6.10 10*6/mm3    Hemoglobin 11.9 (L) 14.0 - 18.0 g/dL    Hematocrit 37.1 (L) 42.0 - 52.0 %    MCV 94.9 (H) 80.0 - 94.0 fL    MCH 30.4 27.0 - 31.0 pg    MCHC 32.1 31.0 - 37.0 g/dL    RDW 13.6 11.5 - 14.5 %    Platelets 220 140 - 500 10*3/mm3   TSH   Result Value Ref Range    TSH 2.040 0.270 - 4.200 mIU/mL   Urinalysis With / Microscopic If Indicated   Result Value Ref Range    Specific Gravity, UA 1.020 1.003 - 1.030    pH, UA 7.0 4.5 - 8.0    Color, UA Yellow     Appearance, UA Cloudy (A) Clear    Leukocytes, UA See below: (A) Negative    Protein See below: (A) Negative    Glucose, UA Negative Negative    Ketones Negative     Blood, UA See below: (A) Negative    Bilirubin, UA Negative Negative    Urobilinogen, UA Comment     Nitrite, UA Positive (A) Negative   Lipid Panel With LDL / HDL Ratio   Result Value Ref Range    Total Cholesterol 155 0 - 200 mg/dL    Triglycerides 115 0 - 150 mg/dL    HDL Cholesterol 51 40 - 60 mg/dL    VLDL Cholesterol 23 8 - 32 mg/dL    LDL Cholesterol  81 0 - 100 mg/dL    LDL/HDL Ratio 1.59    Microscopic Examination   Result Value Ref Range    WBC, UA See below: (A) None Seen /hpf    RBC, UA 21-30 (A) None Seen /hpf    Epithelial Cells (non renal) Comment /hpf    Bacteria, UA 1+ (A) None Seen /hpf        Vitals:    09/28/17 0904   BP: 140/86   BP Location: Left arm   Patient Position: Sitting   Pulse: 64   Temp: 97.5 °F (36.4 °C)   SpO2: 97%   Weight: 163 lb (73.9 kg)   Height: 69\" (175.3 cm)     Objective    Gen: alert, pleasant.  HEENT: PERRL, EOMI intact, lids ok, " ear canals clear, TMs normal, throat clear, nostrils normal  Neck: no bruit, no enlarged thyroid  Lungs: CTA  Heart: RR no murmur  ABD: soft , + BS  Pulses: intact  Mood: stable     Assessment/Plan   Hypertension, normal blood pressure Evidence of target organ damage: none.    Jim was seen today for follow-up, hypertension and hyperlipidemia.    Diagnoses and all orders for this visit:    Chronic idiopathic gout involving toe of right foot without tophus    Essential hypertension    Mixed hyperlipidemia    Recurrent major depressive disorder, in partial remission      Medication: no change.  Follow up: 6 months and as needed.    There are no Patient Instructions on file for this visit.  There are no discontinued medications.     No Follow-up on file.    Limit salt  Limit alcoholic drinks to 1 a day  Limit caffeine to 1-2 servings a day    Dr. Robby Henley MD  [place], [place]  [facility].

## 2017-12-08 ENCOUNTER — OFFICE VISIT (OUTPATIENT)
Dept: FAMILY MEDICINE CLINIC | Facility: CLINIC | Age: 72
End: 2017-12-08

## 2017-12-08 VITALS
HEIGHT: 69 IN | OXYGEN SATURATION: 99 % | DIASTOLIC BLOOD PRESSURE: 84 MMHG | RESPIRATION RATE: 18 BRPM | WEIGHT: 174 LBS | HEART RATE: 60 BPM | SYSTOLIC BLOOD PRESSURE: 152 MMHG | BODY MASS INDEX: 25.77 KG/M2

## 2017-12-08 DIAGNOSIS — R53.83 FATIGUE, UNSPECIFIED TYPE: Primary | ICD-10-CM

## 2017-12-08 DIAGNOSIS — D64.9 ANEMIA, UNSPECIFIED TYPE: ICD-10-CM

## 2017-12-08 DIAGNOSIS — J06.9 ACUTE URI: ICD-10-CM

## 2017-12-08 LAB
BASOPHILS # BLD AUTO: 0.03 10*3/MM3 (ref 0–0.2)
BASOPHILS NFR BLD AUTO: 0.5 % (ref 0–2)
EOSINOPHIL # BLD AUTO: 0.16 10*3/MM3 (ref 0.1–0.3)
EOSINOPHIL NFR BLD AUTO: 2.7 % (ref 0–4)
ERYTHROCYTE [DISTWIDTH] IN BLOOD BY AUTOMATED COUNT: 13.9 % (ref 11.5–14.5)
HCT VFR BLD AUTO: 41.9 % (ref 42–52)
HGB BLD-MCNC: 13.5 G/DL (ref 14–18)
IMM GRANULOCYTES # BLD: 0.02 10*3/MM3 (ref 0–0.03)
IMM GRANULOCYTES NFR BLD: 0.3 % (ref 0–0.5)
IRON SATN MFR SERPL: 24 %
IRON SERPL-MCNC: 71 MCG/DL (ref 59–158)
LYMPHOCYTES # BLD AUTO: 1.55 10*3/MM3 (ref 0.6–4.8)
LYMPHOCYTES NFR BLD AUTO: 26.3 % (ref 20–45)
MCH RBC QN AUTO: 30 PG (ref 27–31)
MCHC RBC AUTO-ENTMCNC: 32.2 G/DL (ref 31–37)
MCV RBC AUTO: 93.1 FL (ref 80–94)
MONOCYTES # BLD AUTO: 0.38 10*3/MM3 (ref 0–1)
MONOCYTES NFR BLD AUTO: 6.4 % (ref 3–8)
NEUTROPHILS # BLD AUTO: 3.76 10*3/MM3 (ref 1.5–8.3)
NEUTROPHILS NFR BLD AUTO: 63.8 % (ref 45–70)
NRBC BLD AUTO-RTO: 0 /100 WBC (ref 0–0)
PLATELET # BLD AUTO: 198 10*3/MM3 (ref 140–500)
RBC # BLD AUTO: 4.5 10*6/MM3 (ref 4.7–6.1)
TIBC SERPL-MCNC: 292 MCG/DL (ref 261–498)
UIBC SERPL-MCNC: 221 MCG/DL (ref 112–346)
WBC # BLD AUTO: 5.9 10*3/MM3 (ref 4.8–10.8)

## 2017-12-08 PROCEDURE — 99213 OFFICE O/P EST LOW 20 MIN: CPT | Performed by: NURSE PRACTITIONER

## 2017-12-08 RX ORDER — SOLIFENACIN SUCCINATE 5 MG/1
5 TABLET, FILM COATED ORAL DAILY
COMMUNITY
End: 2018-10-31

## 2017-12-08 NOTE — PROGRESS NOTES
Subjective   Jim Frances is a 72 y.o. male.     Chief Complaint   Patient presents with   • Sinusitis     headache, runny nose, x 3 days. would also like iron level checked     Social History   Substance Use Topics   • Smoking status: Light Tobacco Smoker     Types: Cigars   • Smokeless tobacco: Not on file      Comment: 1 per mo   • Alcohol use 8.4 oz/week     14 Shots of liquor per week      Comment: 2 drinks a day        HPI Comments: Dr Henley had done some labs a few months ago and had low iron. Would like it rechecked. Still fatigued.     URI    This is a new problem. The current episode started in the past 7 days (3 days). The problem has been unchanged. There has been no fever. Associated symptoms include congestion, coughing (non-productive), rhinorrhea, sneezing and a sore throat (dry/scratchy). Pertinent negatives include no ear pain, plugged ear sensation, sinus pain or wheezing. Treatments tried: nyquil, mucinex. The treatment provided mild relief.     Review of Systems   Constitutional: Positive for fatigue. Negative for chills and fever.   HENT: Positive for congestion, postnasal drip, rhinorrhea, sneezing and sore throat (dry/scratchy). Negative for ear pain, sinus pain and sinus pressure.    Respiratory: Positive for cough (non-productive). Negative for wheezing.    Neurological: Negative.    Psychiatric/Behavioral: Negative.    All other systems reviewed and are negative.    Physical Exam   Gen: mildly ill appearing, alert  Ears: Tm's bulging without redness  Nose:  Congestion  Throat:  Red without exudate, some drainage, tonsils okay  Neck: no LAD  Lung: good air movement, regular RR  Heart: RR without murmur  Skin: no rash.    The following portions of the patient's history were reviewed and updated as appropriate: allergies, current medications,past medical hx, family hx, past social history an...    Chief Complaint   Patient presents with   • Sinusitis     headache, runny nose, x 3 days.  "would also like iron level checked       Vitals:    12/08/17 0952   BP: 152/84   BP Location: Left arm   Patient Position: Sitting   Cuff Size: Adult   Pulse: 60   Resp: 18   SpO2: 99%   Weight: 78.9 kg (174 lb)   Height: 175.3 cm (69\")       Assessment/Plan   Problems Addressed this Visit     None      Visit Diagnoses     Fatigue, unspecified type    -  Primary    Relevant Orders    Iron and TIBC    CBC Auto Differential    Anemia, unspecified type        Relevant Orders    Iron and TIBC    CBC Auto Differential    Acute URI            URI still in viral period. May call sometime next week if no improvement to discuss abx if appropriate.        Tylenol or Advil as needed for pain, fever, muscle aches  Plenty of fluids  Hand washing discussed  Off work or school note given if needed.  Warm tea for throat.      Mitzi JACQUES  Family Practice  [place], [place]  [facility]  "

## 2017-12-11 ENCOUNTER — TELEPHONE (OUTPATIENT)
Dept: FAMILY MEDICINE CLINIC | Facility: CLINIC | Age: 72
End: 2017-12-11

## 2017-12-11 DIAGNOSIS — J01.10 ACUTE NON-RECURRENT FRONTAL SINUSITIS: ICD-10-CM

## 2017-12-11 DIAGNOSIS — J01.10 ACUTE NON-RECURRENT FRONTAL SINUSITIS: Primary | ICD-10-CM

## 2017-12-11 RX ORDER — AMOXICILLIN AND CLAVULANATE POTASSIUM 875; 125 MG/1; MG/1
1 TABLET, FILM COATED ORAL 2 TIMES DAILY
Qty: 14 TABLET | Refills: 0 | Status: SHIPPED | OUTPATIENT
Start: 2017-12-13 | End: 2017-12-20

## 2017-12-11 RX ORDER — AMOXICILLIN AND CLAVULANATE POTASSIUM 875; 125 MG/1; MG/1
1 TABLET, FILM COATED ORAL 2 TIMES DAILY
Qty: 14 TABLET | Refills: 0 | Status: SHIPPED | OUTPATIENT
Start: 2017-12-13 | End: 2017-12-11 | Stop reason: SDUPTHER

## 2017-12-11 NOTE — TELEPHONE ENCOUNTER
Pt called   fadi that he is still not feeling better since his apt on 12/8 and would like something called in to his [facility]'s pharmacy. Please advise

## 2018-03-23 DIAGNOSIS — F33.41 RECURRENT MAJOR DEPRESSIVE DISORDER, IN PARTIAL REMISSION (HCC): ICD-10-CM

## 2018-03-23 RX ORDER — SERTRALINE HYDROCHLORIDE 100 MG/1
TABLET, FILM COATED ORAL
Qty: 90 TABLET | Refills: 0 | Status: SHIPPED | OUTPATIENT
Start: 2018-03-23 | End: 2018-04-25 | Stop reason: SDUPTHER

## 2018-03-26 ENCOUNTER — OFFICE VISIT (OUTPATIENT)
Dept: FAMILY MEDICINE CLINIC | Facility: CLINIC | Age: 73
End: 2018-03-26

## 2018-03-26 VITALS
DIASTOLIC BLOOD PRESSURE: 80 MMHG | TEMPERATURE: 97.9 F | WEIGHT: 174.5 LBS | OXYGEN SATURATION: 95 % | BODY MASS INDEX: 25.84 KG/M2 | HEART RATE: 68 BPM | HEIGHT: 69 IN | SYSTOLIC BLOOD PRESSURE: 128 MMHG

## 2018-03-26 DIAGNOSIS — M25.562 LATERAL KNEE PAIN, LEFT: Primary | ICD-10-CM

## 2018-03-26 DIAGNOSIS — G57.01 PIRIFORMIS SYNDROME, RIGHT: ICD-10-CM

## 2018-03-26 PROCEDURE — 99213 OFFICE O/P EST LOW 20 MIN: CPT | Performed by: FAMILY MEDICINE

## 2018-03-26 RX ORDER — MODAFINIL 200 MG/1
200 TABLET ORAL 2 TIMES DAILY
COMMUNITY
Start: 2018-01-02 | End: 2020-06-08

## 2018-03-26 NOTE — PROGRESS NOTES
Subjective   Jim Frances is a 72 y.o. male who is here for   Chief Complaint   Patient presents with   • Leg Pain     left, x 2 weeks    • Hip Pain     right, 1 month    .     History of Present Illness   Knee Pain: Patient presents with knee pain involving the  right knee. Onset of the symptoms was several months ago. Inciting event: none known. Current symptoms include pain located mild and stiffness. Pain is aggravated by .  Patient has had no prior knee problems. Evaluation to date: none. Treatment to date: avoidance of offending activity.  Pain is lateral under the lateral femoral condyle  Hurt when he is on his knees on the ground  No pain with walking, or steps, or squats, or twist  But no pain around the patella.    Also pain in right buttock during car trips or sitting at home  No lumbar pain  Some radiating pain into right hamstrings.      The following portions of the patient's history were reviewed and updated as appropriate: allergies, current medications, past medical history, past social history, past surgical history and problem list.    Review of Systems    Objective   Physical Exam   Constitutional: He appears well-developed and well-nourished.   Musculoskeletal:        Right hip: He exhibits tenderness. He exhibits normal range of motion and normal strength.        Left knee: He exhibits bony tenderness. He exhibits normal range of motion, no swelling, no effusion, no ecchymosis, no deformity, normal alignment, no LCL laxity, normal patellar mobility, normal meniscus and no MCL laxity. Tenderness found. Lateral joint line tenderness noted. No medial joint line, no MCL, no LCL and no patellar tendon tenderness noted.        Legs:  Nursing note and vitals reviewed.      Assessment/Plan   Jim was seen today for leg pain and hip pain.    Diagnoses and all orders for this visit:    Lateral knee pain, left    Piriformis syndrome, right      Patient Instructions   Home exercises and handouts  given for both  Prn nsaids      Medications Discontinued During This Encounter   Medication Reason   • modafinil (PROVIGIL) 100 MG tablet Dose adjustment        No Follow-up on file.    Dr. Robby Henley  [place], [place]

## 2018-04-16 ENCOUNTER — TELEPHONE (OUTPATIENT)
Dept: FAMILY MEDICINE CLINIC | Facility: CLINIC | Age: 73
End: 2018-04-16

## 2018-04-17 DIAGNOSIS — C61 PROSTATE CANCER (HCC): Primary | ICD-10-CM

## 2018-04-17 DIAGNOSIS — I10 ESSENTIAL HYPERTENSION: ICD-10-CM

## 2018-04-17 DIAGNOSIS — E78.2 MIXED HYPERLIPIDEMIA: ICD-10-CM

## 2018-04-17 DIAGNOSIS — M1A.0710 CHRONIC IDIOPATHIC GOUT INVOLVING TOE OF RIGHT FOOT WITHOUT TOPHUS: ICD-10-CM

## 2018-04-17 DIAGNOSIS — R53.83 FATIGUE, UNSPECIFIED TYPE: ICD-10-CM

## 2018-04-18 LAB
ALBUMIN SERPL-MCNC: 4.4 G/DL (ref 3.5–5.2)
ALBUMIN/GLOB SERPL: 1.5 G/DL
ALP SERPL-CCNC: 68 U/L (ref 40–129)
ALT SERPL-CCNC: 15 U/L (ref 5–41)
AST SERPL-CCNC: 22 U/L (ref 5–40)
BILIRUB SERPL-MCNC: 0.3 MG/DL (ref 0.2–1.2)
BUN SERPL-MCNC: 20 MG/DL (ref 8–23)
BUN/CREAT SERPL: 18.3 (ref 7–25)
CALCIUM SERPL-MCNC: 9.5 MG/DL (ref 8.8–10.5)
CHLORIDE SERPL-SCNC: 100 MMOL/L (ref 98–107)
CHOLEST SERPL-MCNC: 189 MG/DL (ref 0–200)
CO2 SERPL-SCNC: 33.3 MMOL/L (ref 22–29)
CREAT SERPL-MCNC: 1.09 MG/DL (ref 0.76–1.27)
ERYTHROCYTE [DISTWIDTH] IN BLOOD BY AUTOMATED COUNT: 13.2 % (ref 11.5–14.5)
GFR SERPLBLD CREATININE-BSD FMLA CKD-EPI: 66 ML/MIN/1.73
GFR SERPLBLD CREATININE-BSD FMLA CKD-EPI: 81 ML/MIN/1.73
GLOBULIN SER CALC-MCNC: 3 GM/DL
GLUCOSE SERPL-MCNC: 105 MG/DL (ref 65–99)
HCT VFR BLD AUTO: 41.1 % (ref 42–52)
HDLC SERPL-MCNC: 55 MG/DL (ref 40–60)
HGB BLD-MCNC: 13.6 G/DL (ref 14–18)
LDLC SERPL CALC-MCNC: 104 MG/DL (ref 0–100)
LDLC/HDLC SERPL: 1.9 {RATIO}
MCH RBC QN AUTO: 31.9 PG (ref 27–31)
MCHC RBC AUTO-ENTMCNC: 33.1 G/DL (ref 31–37)
MCV RBC AUTO: 96.3 FL (ref 80–94)
PLATELET # BLD AUTO: 153 10*3/MM3 (ref 140–500)
POTASSIUM SERPL-SCNC: 4.3 MMOL/L (ref 3.5–5.2)
PROT SERPL-MCNC: 7.4 G/DL (ref 6–8.5)
PSA SERPL-MCNC: 0.48 NG/ML (ref 0–4)
RBC # BLD AUTO: 4.27 10*6/MM3 (ref 4.7–6.1)
SODIUM SERPL-SCNC: 142 MMOL/L (ref 136–145)
TRIGL SERPL-MCNC: 148 MG/DL (ref 0–150)
TSH SERPL DL<=0.005 MIU/L-ACNC: 1.57 MIU/ML (ref 0.27–4.2)
URATE SERPL-MCNC: 7.2 MG/DL (ref 3.4–7)
VLDLC SERPL CALC-MCNC: 29.6 MG/DL (ref 8–32)
WBC # BLD AUTO: 5.02 10*3/MM3 (ref 4.8–10.8)

## 2018-04-19 ENCOUNTER — TELEPHONE (OUTPATIENT)
Dept: FAMILY MEDICINE CLINIC | Facility: CLINIC | Age: 73
End: 2018-04-19

## 2018-04-22 DIAGNOSIS — M1A.0710 CHRONIC IDIOPATHIC GOUT INVOLVING TOE OF RIGHT FOOT WITHOUT TOPHUS: ICD-10-CM

## 2018-04-23 RX ORDER — ALLOPURINOL 100 MG/1
TABLET ORAL
Qty: 180 TABLET | Refills: 3 | Status: SHIPPED | OUTPATIENT
Start: 2018-04-23 | End: 2019-05-22 | Stop reason: SDUPTHER

## 2018-04-25 ENCOUNTER — OFFICE VISIT (OUTPATIENT)
Dept: FAMILY MEDICINE CLINIC | Facility: CLINIC | Age: 73
End: 2018-04-25

## 2018-04-25 VITALS
HEIGHT: 69 IN | HEART RATE: 69 BPM | OXYGEN SATURATION: 97 % | DIASTOLIC BLOOD PRESSURE: 74 MMHG | BODY MASS INDEX: 26.36 KG/M2 | SYSTOLIC BLOOD PRESSURE: 130 MMHG | TEMPERATURE: 98.1 F | WEIGHT: 178 LBS

## 2018-04-25 DIAGNOSIS — I10 ESSENTIAL HYPERTENSION: ICD-10-CM

## 2018-04-25 DIAGNOSIS — Z00.00 MEDICARE ANNUAL WELLNESS VISIT, SUBSEQUENT: ICD-10-CM

## 2018-04-25 DIAGNOSIS — G47.33 OBSTRUCTIVE SLEEP APNEA SYNDROME: ICD-10-CM

## 2018-04-25 DIAGNOSIS — M1A.0710 CHRONIC IDIOPATHIC GOUT INVOLVING TOE OF RIGHT FOOT WITHOUT TOPHUS: Primary | ICD-10-CM

## 2018-04-25 DIAGNOSIS — Z85.46 PERSONAL HISTORY OF PROSTATE CANCER: ICD-10-CM

## 2018-04-25 DIAGNOSIS — F33.41 RECURRENT MAJOR DEPRESSIVE DISORDER, IN PARTIAL REMISSION (HCC): ICD-10-CM

## 2018-04-25 DIAGNOSIS — Z23 NEED FOR VACCINATION FOR PNEUMOCOCCUS: ICD-10-CM

## 2018-04-25 DIAGNOSIS — E78.2 MIXED HYPERLIPIDEMIA: ICD-10-CM

## 2018-04-25 PROCEDURE — G0009 ADMIN PNEUMOCOCCAL VACCINE: HCPCS | Performed by: FAMILY MEDICINE

## 2018-04-25 PROCEDURE — G0439 PPPS, SUBSEQ VISIT: HCPCS | Performed by: FAMILY MEDICINE

## 2018-04-25 PROCEDURE — 90670 PCV13 VACCINE IM: CPT | Performed by: FAMILY MEDICINE

## 2018-04-25 RX ORDER — LISINOPRIL 10 MG/1
10 TABLET ORAL DAILY
Qty: 90 TABLET | Refills: 3 | Status: SHIPPED | OUTPATIENT
Start: 2018-04-25 | End: 2019-04-20 | Stop reason: SDUPTHER

## 2018-04-25 RX ORDER — SERTRALINE HYDROCHLORIDE 100 MG/1
100 TABLET, FILM COATED ORAL DAILY
Qty: 90 TABLET | Refills: 3 | Status: SHIPPED | OUTPATIENT
Start: 2018-04-25 | End: 2019-05-22 | Stop reason: SDUPTHER

## 2018-04-25 NOTE — PROGRESS NOTES
QUICK REFERENCE INFORMATION:  The ABCs of the Annual Wellness Visit    Subsequent Medicare Wellness Visit    HEALTH RISK ASSESSMENT    1945    Recent Hospitalizations:  No hospitalization(s) within the last year..        Current Medical Providers:  Patient Care Team:  Robby Henley MD as PCP - General (Family Medicine)  Camron Galeana MD as PCP - Claims Attributed  Camron Galeana MD as Consulting Physician (Urology)  Matheus Dexter MD as Consulting Physician (Pulmonary Disease)  Igor Miller Jr., MD as Consulting Physician (Urology)        Smoking Status:  History   Smoking Status   • Light Tobacco Smoker   • Types: Cigars   Smokeless Tobacco   • Not on file     Comment: 1 per mo       Alcohol Consumption:  History   Alcohol Use   • 8.4 oz/week   • 14 Shots of liquor per week     Comment: 2 drinks a day        Depression Screen:   PHQ-2/PHQ-9 Depression Screening 4/25/2018   Little interest or pleasure in doing things 0   Feeling down, depressed, or hopeless 0   Total Score 0       Health Habits and Functional and Cognitive Screening:  Functional & Cognitive Status 4/25/2018   Do you have difficulty preparing food and eating? No   Do you have difficulty bathing yourself, getting dressed or grooming yourself? No   Do you have difficulty using the toilet? No   Do you have difficulty moving around from place to place? No   Do you have trouble with steps or getting out of a bed or a chair? No   In the past year have you fallen or experienced a near fall? No   Current Diet Well Balanced Diet   Dental Exam Up to date   Eye Exam Up to date   Exercise (times per week) 0 times per week   Current Exercise Activities Include None   Do you need help using the phone?  No   Are you deaf or do you have serious difficulty hearing?  No   Do you need help with transportation? No   Do you need help shopping? No   Do you need help preparing meals?  No   Do you need help with housework?  No   Do you need help with  laundry? No   Do you need help taking your medications? No   Do you need help managing money? No   Do you ever drive or ride in a car without wearing a seat belt? No   Have you felt unusual stress, anger or loneliness in the last month? No   Who do you live with? Spouse   If you need help, do you have trouble finding someone available to you? No   Have you been bothered in the last four weeks by sexual problems? No   Do you have difficulty concentrating, remembering or making decisions? No           Does the patient have evidence of cognitive impairment? No    Aspirin use counseling: Does not need ASA (and currently is not on it)      Recent Lab Results:  CMP:    Lipid Panel:    HbA1c:       Visual Acuity:  No exam data present    Age-appropriate Screening Schedule:  Refer to the list below for future screening recommendations based on patient's age, sex and/or medical conditions. Orders for these recommended tests are listed in the plan section. The patient has been provided with a written plan.    Health Maintenance   Topic Date Due   • ZOSTER VACCINE  03/28/2017   • INFLUENZA VACCINE  08/01/2018   • LIPID PANEL  04/18/2019   • PNEUMOCOCCAL VACCINES (65+ LOW/MEDIUM RISK) (2 of 2 - PPSV23) 04/25/2019   • COLONOSCOPY  06/15/2025   • TDAP/TD VACCINES (2 - Td) 01/11/2027        Subjective   History of Present Illness    Jim Frances is a 72 y.o. male who presents for an Subsequent Wellness Visit.    The following portions of the patient's history were reviewed and updated as appropriate: allergies, current medications, past family history, past medical history, past social history, past surgical history and problem list.    Outpatient Medications Prior to Visit   Medication Sig Dispense Refill   • allopurinol (ZYLOPRIM) 100 MG tablet TAKE 2 TABLETS DAILY (PRIMARY GOUT) 180 tablet 3   • cholecalciferol (VITAMIN D3) 1000 UNITS tablet Take 1,000 Units by mouth Daily.     • modafinil (PROVIGIL) 200 MG tablet Take 200  mg by mouth 2 (Two) Times a Day.     • Omega-3 Fatty Acids (FISH OIL) 1000 MG capsule capsule Take  by mouth Daily With Breakfast.     • Sodium Oxybate (XYREM) 500 MG/ML solution 4.5 gm diluted in 60 ml of water at 11pm and 2am. 3 bottle 1   • solifenacin (VESICARE) 5 MG tablet Take 5 mg by mouth Daily.     • tamsulosin (FLOMAX) 0.4 MG capsule 24 hr capsule Take 1 capsule by mouth Daily.     • lisinopril (PRINIVIL,ZESTRIL) 10 MG tablet Take 1 tablet by mouth Daily. Indications: High Blood Pressure 90 tablet 3   • sertraline (ZOLOFT) 100 MG tablet TAKE 1 TABLET DAILY FOR MAJOR DEPRESSIVE DISORDER 90 tablet 0   • indomethacin (INDOCIN) 50 MG capsule Take 1 capsule by mouth 3 (Three) Times a Day As Needed for Mild Pain (1-3). Indications: Acute Joint Inflammation in Gout 30 capsule 1   • fesoterodine fumarate (TOVIAZ ER) 4 MG tablet sustained-release 24 hour capsule Take 4 mg by mouth Daily.     • sildenafil (VIAGRA) 100 MG tablet Take 100 mg by mouth Daily As Needed for erectile dysfunction.     • uribel (URO-MP) 118 MG capsule capsule Take 118 mg by mouth 2 (Two) Times a Day.       No facility-administered medications prior to visit.        Patient Active Problem List   Diagnosis   • Personal history of prostate cancer   • Primary narcolepsy without cataplexy   • Essential hypertension   • Chronic idiopathic gout involving toe of right foot without tophus   • Recurrent major depressive disorder, in partial remission   • Mixed hyperlipidemia   • Obstructive sleep apnea syndrome   • Piriformis syndrome, right   • Lateral knee pain, left   • Medicare annual wellness visit, subsequent       Advance Care Planning:  has an advance directive - a copy HAS NOT been provided    Identification of Risk Factors:  Risk factors include: chronic pain, inadequate social support, caretaker stress, depression and financial stress.    Review of Systems    Compared to one year ago, the patient feels his physical health is the  "same.  Compared to one year ago, the patient feels his mental health is worse.    Objective     Physical Exam   Constitutional: He appears well-developed and well-nourished.   Cardiovascular: Normal rate.    Pulmonary/Chest: Effort normal.   Abdominal: Soft.   Neurological: He is alert.   Nursing note and vitals reviewed.      Vitals:    04/25/18 0834   BP: 130/74   BP Location: Left arm   Patient Position: Sitting   Pulse: 69   Temp: 98.1 °F (36.7 °C)   SpO2: 97%   Weight: 80.7 kg (178 lb)   Height: 175.3 cm (69\")   PainSc: 0-No pain       Patient's Body mass index is 26.29 kg/m². BMI is within normal parameters. No follow-up required.      Assessment/Plan   Patient Self-Management and Personalized Health Advice  The patient has been provided with information about: diet, exercise, weight management and prevention of cardiac or vascular disease and preventive services including:   · Diabetes screening, see lab orders, Pneumococcal vaccine , Prostate cancer screening discussed.    Visit Diagnoses:    ICD-10-CM ICD-9-CM   1. Chronic idiopathic gout involving toe of right foot without tophus M1A.0710 274.02   2. Essential hypertension I10 401.9   3. Mixed hyperlipidemia E78.2 272.2   4. Obstructive sleep apnea syndrome G47.33 327.23   5. Recurrent major depressive disorder, in partial remission F33.41 296.35   6. Personal history of prostate cancer Z85.46 V10.46   7. Medicare annual wellness visit, subsequent Z00.00 V70.0   8. Need for vaccination for pneumococcus Z23 V03.82       Orders Placed This Encounter   Procedures   • Pneumococcal Conjugate Vaccine 13-Valent All       Outpatient Encounter Prescriptions as of 4/25/2018   Medication Sig Dispense Refill   • allopurinol (ZYLOPRIM) 100 MG tablet TAKE 2 TABLETS DAILY (PRIMARY GOUT) 180 tablet 3   • cholecalciferol (VITAMIN D3) 1000 UNITS tablet Take 1,000 Units by mouth Daily.     • lisinopril (PRINIVIL,ZESTRIL) 10 MG tablet Take 1 tablet by mouth Daily. 90 tablet 3 "   • modafinil (PROVIGIL) 200 MG tablet Take 200 mg by mouth 2 (Two) Times a Day.     • Omega-3 Fatty Acids (FISH OIL) 1000 MG capsule capsule Take  by mouth Daily With Breakfast.     • sertraline (ZOLOFT) 100 MG tablet Take 1 tablet by mouth Daily. 90 tablet 3   • Sodium Oxybate (XYREM) 500 MG/ML solution 4.5 gm diluted in 60 ml of water at 11pm and 2am. 3 bottle 1   • solifenacin (VESICARE) 5 MG tablet Take 5 mg by mouth Daily.     • tamsulosin (FLOMAX) 0.4 MG capsule 24 hr capsule Take 1 capsule by mouth Daily.     • [DISCONTINUED] lisinopril (PRINIVIL,ZESTRIL) 10 MG tablet Take 1 tablet by mouth Daily. Indications: High Blood Pressure 90 tablet 3   • [DISCONTINUED] sertraline (ZOLOFT) 100 MG tablet TAKE 1 TABLET DAILY FOR MAJOR DEPRESSIVE DISORDER 90 tablet 0   • indomethacin (INDOCIN) 50 MG capsule Take 1 capsule by mouth 3 (Three) Times a Day As Needed for Mild Pain (1-3). Indications: Acute Joint Inflammation in Gout 30 capsule 1   • [DISCONTINUED] fesoterodine fumarate (TOVIAZ ER) 4 MG tablet sustained-release 24 hour capsule Take 4 mg by mouth Daily.     • [DISCONTINUED] sildenafil (VIAGRA) 100 MG tablet Take 100 mg by mouth Daily As Needed for erectile dysfunction.     • [DISCONTINUED] uribel (URO-MP) 118 MG capsule capsule Take 118 mg by mouth 2 (Two) Times a Day.       No facility-administered encounter medications on file as of 4/25/2018.        Reviewed use of high risk medication in the elderly: yes  Reviewed for potential of harmful drug interactions in the elderly: yes   Still on heavy sleeping drugs.  Reviewed labs  Tons of stress at home caring for his wife.    Follow Up:  Return in about 6 months (around 10/25/2018).     An After Visit Summary and PPPS with all of these plans were given to the patient.

## 2018-04-25 NOTE — PATIENT INSTRUCTIONS
Results for orders placed or performed in visit on 04/17/18   PSA DIAGNOSTIC   Result Value Ref Range    PSA 0.485 0.000 - 4.000 ng/mL   Comprehensive metabolic panel   Result Value Ref Range    Glucose 105 (H) 65 - 99 mg/dL    BUN 20 8 - 23 mg/dL    Creatinine 1.09 0.76 - 1.27 mg/dL    eGFR Non African Am 66 >60 mL/min/1.73    eGFR African Am 81 >60 mL/min/1.73    BUN/Creatinine Ratio 18.3 7.0 - 25.0    Sodium 142 136 - 145 mmol/L    Potassium 4.3 3.5 - 5.2 mmol/L    Chloride 100 98 - 107 mmol/L    Total CO2 33.3 (H) 22.0 - 29.0 mmol/L    Calcium 9.5 8.8 - 10.5 mg/dL    Total Protein 7.4 6.0 - 8.5 g/dL    Albumin 4.40 3.50 - 5.20 g/dL    Globulin 3.0 gm/dL    A/G Ratio 1.5 g/dL    Total Bilirubin 0.3 0.2 - 1.2 mg/dL    Alkaline Phosphatase 68 40 - 129 U/L    AST (SGOT) 22 5 - 40 U/L    ALT (SGPT) 15 5 - 41 U/L   CBC (No Diff)   Result Value Ref Range    WBC 5.02 4.80 - 10.80 10*3/mm3    RBC 4.27 (L) 4.70 - 6.10 10*6/mm3    Hemoglobin 13.6 (L) 14.0 - 18.0 g/dL    Hematocrit 41.1 (L) 42.0 - 52.0 %    MCV 96.3 (H) 80.0 - 94.0 fL    MCH 31.9 (H) 27.0 - 31.0 pg    MCHC 33.1 31.0 - 37.0 g/dL    RDW 13.2 11.5 - 14.5 %    Platelets 153 140 - 500 10*3/mm3   TSH   Result Value Ref Range    TSH 1.570 0.270 - 4.200 mIU/mL   Lipid Panel With LDL / HDL Ratio   Result Value Ref Range    Total Cholesterol 189 0 - 200 mg/dL    Triglycerides 148 0 - 150 mg/dL    HDL Cholesterol 55 40 - 60 mg/dL    VLDL Cholesterol 29.6 8 - 32 mg/dL    LDL Cholesterol  104 (H) 0 - 100 mg/dL    LDL/HDL Ratio 1.90    Uric Acid   Result Value Ref Range    Uric Acid 7.2 (H) 3.4 - 7.0 mg/dL

## 2018-07-05 RX ORDER — TAMSULOSIN HYDROCHLORIDE 0.4 MG/1
1 CAPSULE ORAL DAILY
Qty: 90 CAPSULE | Refills: 1 | Status: SHIPPED | OUTPATIENT
Start: 2018-07-05 | End: 2019-01-01 | Stop reason: SDUPTHER

## 2018-10-25 ENCOUNTER — TELEPHONE (OUTPATIENT)
Dept: FAMILY MEDICINE CLINIC | Facility: CLINIC | Age: 73
End: 2018-10-25

## 2018-10-26 NOTE — TELEPHONE ENCOUNTER
He has already had a pneumonia shot earlier this year. He can not get another one until next year.

## 2018-10-29 ENCOUNTER — FLU SHOT (OUTPATIENT)
Dept: FAMILY MEDICINE CLINIC | Facility: CLINIC | Age: 73
End: 2018-10-29

## 2018-10-29 PROCEDURE — G0008 ADMIN INFLUENZA VIRUS VAC: HCPCS | Performed by: FAMILY MEDICINE

## 2018-10-29 PROCEDURE — 90662 IIV NO PRSV INCREASED AG IM: CPT | Performed by: FAMILY MEDICINE

## 2018-10-31 ENCOUNTER — OFFICE VISIT (OUTPATIENT)
Dept: FAMILY MEDICINE CLINIC | Facility: CLINIC | Age: 73
End: 2018-10-31

## 2018-10-31 VITALS
HEART RATE: 61 BPM | BODY MASS INDEX: 25.4 KG/M2 | WEIGHT: 171.5 LBS | SYSTOLIC BLOOD PRESSURE: 112 MMHG | DIASTOLIC BLOOD PRESSURE: 72 MMHG | HEIGHT: 69 IN | OXYGEN SATURATION: 98 %

## 2018-10-31 DIAGNOSIS — E78.2 MIXED HYPERLIPIDEMIA: ICD-10-CM

## 2018-10-31 DIAGNOSIS — I10 ESSENTIAL HYPERTENSION: Primary | ICD-10-CM

## 2018-10-31 PROCEDURE — 99213 OFFICE O/P EST LOW 20 MIN: CPT | Performed by: FAMILY MEDICINE

## 2018-10-31 NOTE — PROGRESS NOTES
Chief Complaint   Patient presents with   • Follow-up   • Hyperlipidemia   • Hypertension       Subjective     Patient here for follow-up of elevated blood pressure.    He is exercising and is adherent to a low-salt diet.    Blood pressure is well controlled at home.   Cardiac symptoms: none.   Patient denies: chest pressure/discomfort, claudication, cough, dyspnea, exertional chest pressure/discomfort, fatigue, irregular heart beat, lower extremity edema, near-syncope, orthopnea, palpitations, paroxysmal nocturnal dyspnea, syncope, tachypnea and weight gain.   Cardiovascular risk factors: advanced age (older than 55 for men, 65 for women), hypertension and male gender.   Use of agents associated with hypertension: amphetamines.   History of target organ damage: none.  Patient is taking prescribed hypertension medications as prescribed without side effects.  Feeling well  UTD with Urology and Sleep MD  Ok on refills  No gout flairs  Mood is stable      The following portions of the patient's history were reviewed and updated as appropriate: allergies, current medications, past medical history, past social history, past surgical history and problem list.    Review of Systems  Pertinent items are noted in HPI.    Results for orders placed or performed in visit on 04/17/18   PSA DIAGNOSTIC   Result Value Ref Range    PSA 0.485 0.000 - 4.000 ng/mL   Comprehensive metabolic panel   Result Value Ref Range    Glucose 105 (H) 65 - 99 mg/dL    BUN 20 8 - 23 mg/dL    Creatinine 1.09 0.76 - 1.27 mg/dL    eGFR Non African Am 66 >60 mL/min/1.73    eGFR African Am 81 >60 mL/min/1.73    BUN/Creatinine Ratio 18.3 7.0 - 25.0    Sodium 142 136 - 145 mmol/L    Potassium 4.3 3.5 - 5.2 mmol/L    Chloride 100 98 - 107 mmol/L    Total CO2 33.3 (H) 22.0 - 29.0 mmol/L    Calcium 9.5 8.8 - 10.5 mg/dL    Total Protein 7.4 6.0 - 8.5 g/dL    Albumin 4.40 3.50 - 5.20 g/dL    Globulin 3.0 gm/dL    A/G Ratio 1.5 g/dL    Total Bilirubin 0.3 0.2 -  "1.2 mg/dL    Alkaline Phosphatase 68 40 - 129 U/L    AST (SGOT) 22 5 - 40 U/L    ALT (SGPT) 15 5 - 41 U/L   CBC (No Diff)   Result Value Ref Range    WBC 5.02 4.80 - 10.80 10*3/mm3    RBC 4.27 (L) 4.70 - 6.10 10*6/mm3    Hemoglobin 13.6 (L) 14.0 - 18.0 g/dL    Hematocrit 41.1 (L) 42.0 - 52.0 %    MCV 96.3 (H) 80.0 - 94.0 fL    MCH 31.9 (H) 27.0 - 31.0 pg    MCHC 33.1 31.0 - 37.0 g/dL    RDW 13.2 11.5 - 14.5 %    Platelets 153 140 - 500 10*3/mm3   TSH   Result Value Ref Range    TSH 1.570 0.270 - 4.200 mIU/mL   Lipid Panel With LDL / HDL Ratio   Result Value Ref Range    Total Cholesterol 189 0 - 200 mg/dL    Triglycerides 148 0 - 150 mg/dL    HDL Cholesterol 55 40 - 60 mg/dL    VLDL Cholesterol 29.6 8 - 32 mg/dL    LDL Cholesterol  104 (H) 0 - 100 mg/dL    LDL/HDL Ratio 1.90    Uric Acid   Result Value Ref Range    Uric Acid 7.2 (H) 3.4 - 7.0 mg/dL        Vitals:    10/31/18 0911   BP: 112/72   BP Location: Left arm   Patient Position: Sitting   Pulse: 61   SpO2: 98%   Weight: 77.8 kg (171 lb 8 oz)   Height: 175.3 cm (69\")     BP Readings from Last 3 Encounters:   10/31/18 112/72   04/25/18 130/74   03/26/18 128/80     Objective      Gen: alert, pleasant.  HEENT: PERRL, EOMI intact, lids ok, ear canals clear, TMs normal, throat clear, nostrils normal  Neck: no bruit, no enlarged thyroid  Lungs: CTA  Heart: RR no murmur  ABD: soft , + BS  Pulses: intact  Mood: stable     Assessment/Plan   Hypertension, normal blood pressure Evidence of target organ damage: none.    Jim was seen today for follow-up, hyperlipidemia and hypertension.    Diagnoses and all orders for this visit:    Essential hypertension    Mixed hyperlipidemia      Medication: no change.  Follow up: 6 months and as needed.    There are no Patient Instructions on file for this visit.  Medications Discontinued During This Encounter   Medication Reason   • solifenacin (VESICARE) 5 MG tablet *Therapy completed        Return in about 6 months (around " 4/30/2019) for Medicare Wellness visit.    Limit salt  Limit alcoholic drinks to 1 a day  Limit caffeine to 1-2 servings a day    Dr. Robby Henley MD  [place], [place]  [facility].

## 2019-01-02 RX ORDER — TAMSULOSIN HYDROCHLORIDE 0.4 MG/1
CAPSULE ORAL
Qty: 90 CAPSULE | Refills: 1 | Status: SHIPPED | OUTPATIENT
Start: 2019-01-02 | End: 2019-05-22 | Stop reason: SDUPTHER

## 2019-03-19 ENCOUNTER — TELEPHONE (OUTPATIENT)
Dept: FAMILY MEDICINE CLINIC | Facility: CLINIC | Age: 74
End: 2019-03-19

## 2019-03-19 DIAGNOSIS — G89.29 CHRONIC RIGHT HIP PAIN: ICD-10-CM

## 2019-03-19 DIAGNOSIS — M25.551 CHRONIC RIGHT HIP PAIN: ICD-10-CM

## 2019-03-19 DIAGNOSIS — G57.01 PIRIFORMIS SYNDROME, RIGHT: Primary | ICD-10-CM

## 2019-03-19 NOTE — TELEPHONE ENCOUNTER
Pt called and said the last time he was in he spoke to you about his hip pain. You suggested to get an x ray. He would like you to order this for the [facility].

## 2019-03-20 NOTE — TELEPHONE ENCOUNTER
He wants this sent to the hospital on [address] in [place]. Not [place]. Its the right hip.     Ok xray has been ordered    Robby Henley MD

## 2019-03-22 ENCOUNTER — HOSPITAL ENCOUNTER (OUTPATIENT)
Dept: GENERAL RADIOLOGY | Facility: HOSPITAL | Age: 74
Discharge: HOME | End: 2019-03-22
Admitting: FAMILY MEDICINE

## 2019-03-22 DIAGNOSIS — G57.01 PIRIFORMIS SYNDROME, RIGHT: ICD-10-CM

## 2019-03-22 DIAGNOSIS — G89.29 CHRONIC RIGHT HIP PAIN: ICD-10-CM

## 2019-03-22 DIAGNOSIS — M25.551 CHRONIC RIGHT HIP PAIN: ICD-10-CM

## 2019-03-22 PROCEDURE — 73502 X-RAY EXAM HIP UNI 2-3 VIEWS: CPT

## 2019-03-25 DIAGNOSIS — G57.01 PIRIFORMIS SYNDROME, RIGHT: Primary | ICD-10-CM

## 2019-03-28 ENCOUNTER — TREATMENT (OUTPATIENT)
Dept: PHYSICAL THERAPY | Facility: CLINIC | Age: 74
End: 2019-03-28

## 2019-03-28 DIAGNOSIS — G57.01 PIRIFORMIS SYNDROME OF RIGHT SIDE: Primary | ICD-10-CM

## 2019-03-28 PROCEDURE — 97035 APP MDLTY 1+ULTRASOUND EA 15: CPT | Performed by: PHYSICAL THERAPIST

## 2019-03-28 PROCEDURE — G0283 ELEC STIM OTHER THAN WOUND: HCPCS | Performed by: PHYSICAL THERAPIST

## 2019-03-28 PROCEDURE — 97140 MANUAL THERAPY 1/> REGIONS: CPT | Performed by: PHYSICAL THERAPIST

## 2019-03-28 PROCEDURE — 97110 THERAPEUTIC EXERCISES: CPT | Performed by: PHYSICAL THERAPIST

## 2019-03-28 NOTE — PROGRESS NOTES
Physical Therapy Initial Evaluation and Plan of Care    Patient: Jim Frances   : 1945  Diagnosis/ICD-10 Code:  Piriformis syndrome of right side [G57.01]  Referring practitioner: Robby Henley MD    Subjective Evaluation    History of Present Illness  Mechanism of injury: Couple week history of right hip pain, posterior lateral hip - insidious onset - some increased pain in past week  No meds but does take alopurinol for gout, indocin on occasion  Walks his dog 1 mile per day        Patient Occupation: Retired maintenance - sheet [employer] Pain  Current pain ratin  At best pain ratin  At worst pain ratin  Location: posterior<lateral hip, occasional anterior thigh pain, no NT, no pop/click  Quality: throbbing, sharp and knife-like  Relieving factors: change in position  Aggravating factors: ambulation and stairs (active ER of right hip)    Social Support  Lives in: multiple-level home    Diagnostic Tests  X-ray: normal    Treatments  Current treatment: physical therapy  Patient Goals  Patient goal: alleviate pain           Objective       Observations     Additional Observation Details  Gait with some increased upper body sway    Palpation     Right Tenderness of the gluteus john, gluteus medius and piriformis.     Tenderness     Right Hip   Tenderness in the PSIS. No tenderness in the greater trochanter and iliac crest.     Lumbar Screen  Lumbar range of motion within normal limits with the following exceptions:Full and pain free in all planes of motion     Neurological Testing     Sensation     Hip     Right Hip   Intact: light touch    Active Range of Motion     Right Hip   Flexion: 95 degrees   Extension: 5 degrees   Abduction: 40 degrees   Adduction: 30 degrees   External rotation (90/90): 45 degrees   Internal rotation (90/90): 25 degrees     Joint Play     Right Hip   Joints within functional limits are the posterior hip capsule.     Strength/Myotome Testing     Right Hip    Planes of Motion   Flexion: 4  Extension: 4+  Abduction: 4+  Adduction: 4+  External rotation: 4  Internal rotation: 4+    Additional Strength Details  Slight pain inhibition with piriformis strength testing    Tests     Right Hip   Positive Vidya and piriformis.   Negative BETH and scour.   90/90 SLR: Negative.  SLR: Negative.   LEFS Score - 42.5       Assessment & Plan     Assessment  Impairments: abnormal muscle tone, abnormal or restricted ROM, impaired physical strength, lacks appropriate home exercise program and pain with function  Assessment details: 73 y.o. Male with right piriformis syndrome presents with: 1. Intermittent right posterior hip pain, 2. Decreased right hip strength, 3. Slight decreased hip AROM, 4. Tenderness to palpation right posterior hip mm, 5. Decreased tolerance for many normal ADL's due to pain, 6. Desire to resume structured exercise program  Prognosis: good  Functional Limitations: walking, pulling, pushing, standing and stooping  Goals  Plan Goals: Short Term Goals: 2 weeks  Patient will be able to tolerate initial exercises  Patient will have pain <5/10  Patient will be able to walk >15 minutes without increased symptoms  Patient will be able to actively lift and externally rotate right leg without pain    Long Term Goals:   Patient will be independent in performing home exercise program.  Patient will have functional pain free hip AROM  Patient will be able to climb up/down stairs without increased symptoms  Patient will be able to resume a aerobic walking program without increased symptoms     Plan  Therapy options: will be seen for skilled physical therapy services  Planned modality interventions: ultrasound  Planned therapy interventions: manual therapy, home exercise program, strengthening and stretching  Frequency: 2x week  Duration in visits: 8  Duration in weeks: 4  Treatment plan discussed with: patient  Plan details: Patient issued written HEP of exercises performed in  clinic today          Manual Therapy:    12     mins  15521;  Therapeutic Exercise:    20     mins  03372;     Neuromuscular Jb:    0    mins  95637;    Therapeutic Activity:     5     mins  72884;   Probable causes of symptoms, rehab process    Evaluation Time:     30  mins  Timed Treatment:   45   mins   Total Treatment:     90   mins    PT SIGNATURE: Karie Clemente, PT   DATE TREATMENT INITIATED: 3/29/2019    Initial Certification  Certification Period: 6/27/2019  I certify that the therapy services are furnished while this patient is under my care.  The services outlined above are required by this patient, and will be reviewed every 90 days.     PHYSICIAN: Robby Henley MD      DATE:     Please sign and return via fax to [phone].. Thank you, [facility].

## 2019-04-05 ENCOUNTER — TREATMENT (OUTPATIENT)
Dept: PHYSICAL THERAPY | Facility: CLINIC | Age: 74
End: 2019-04-05

## 2019-04-05 DIAGNOSIS — G57.01 PIRIFORMIS SYNDROME OF RIGHT SIDE: Primary | ICD-10-CM

## 2019-04-05 PROCEDURE — 97035 APP MDLTY 1+ULTRASOUND EA 15: CPT | Performed by: PHYSICAL THERAPIST

## 2019-04-05 PROCEDURE — 97140 MANUAL THERAPY 1/> REGIONS: CPT | Performed by: PHYSICAL THERAPIST

## 2019-04-05 PROCEDURE — 97110 THERAPEUTIC EXERCISES: CPT | Performed by: PHYSICAL THERAPIST

## 2019-04-05 NOTE — PROGRESS NOTES
Physical Therapy Daily Progress Note    VISIT#: 2    Subjective   Jim Frances reports: that he is still sore in the right hip, especially the day after therapy.  Did his HEP - just to stretch, not pain.  States that in general he is a little better than last week.     Objective   Tenderness in right piriformis, no tenderness in SI joint     See Exercise, Manual, and Modality Logs for complete treatment.     Patient Education: Re-instruction in proper exercise technique     Assessment/Plan  Persistent tenderness in piriformis but not as significant as last week.    Progress strengthening /stabilization /functional activity           Manual Therapy:    13     mins  25064;  Therapeutic Exercise:    32     mins  41964;     Neuromuscular Jb:    0    mins  42959;    Therapeutic Activity:     0     mins  92481;       Timed Treatment:   53   mins   Total Treatment:     55   mins    Karie Clemente, PT  [place] License # [id number]  Physical Therapist

## 2019-04-11 ENCOUNTER — TREATMENT (OUTPATIENT)
Dept: PHYSICAL THERAPY | Facility: CLINIC | Age: 74
End: 2019-04-11

## 2019-04-11 DIAGNOSIS — G57.01 PIRIFORMIS SYNDROME OF RIGHT SIDE: Primary | ICD-10-CM

## 2019-04-11 PROCEDURE — 97110 THERAPEUTIC EXERCISES: CPT | Performed by: PHYSICAL THERAPIST

## 2019-04-11 PROCEDURE — 97035 APP MDLTY 1+ULTRASOUND EA 15: CPT | Performed by: PHYSICAL THERAPIST

## 2019-04-11 PROCEDURE — 97140 MANUAL THERAPY 1/> REGIONS: CPT | Performed by: PHYSICAL THERAPIST

## 2019-04-11 NOTE — PROGRESS NOTES
Physical Therapy Daily Progress Note    VISIT#: 3    Subjective   Jim Frances reports: that he is no worse but is still having pain with active ER.  He reports one episode of anterior right thigh pain      Objective   Tenderness in right piriformis muscle    Normal gait pattern    Spinal flexion 75% without increased pain    See Exercise, Manual, and Modality Logs for complete treatment.     Patient Education: concept of strain/counterstrain    Assessment/Plan  Patient with persistent pain in right buttocks with active ER or LE.  Tenderness persists but much less noted after treatment today.    Progress strengthening /stabilization /functional activity  Reassess spine involvement if not significant improvement by next week.         Manual Therapy:    17     mins  71621;  Therapeutic Exercise:    15     mins  95523;     Neuromuscular Jb:    0    mins  45569;    Therapeutic Activity:     0     mins  27012;       Timed Treatment:   40   mins   Total Treatment:     50   mins    Karie Clemente, PT  [place] License # [id number]  Physical Therapist

## 2019-04-17 ENCOUNTER — TREATMENT (OUTPATIENT)
Dept: PHYSICAL THERAPY | Facility: CLINIC | Age: 74
End: 2019-04-17

## 2019-04-17 DIAGNOSIS — M54.50 ACUTE RIGHT-SIDED LOW BACK PAIN WITHOUT SCIATICA: Primary | ICD-10-CM

## 2019-04-17 PROCEDURE — 97140 MANUAL THERAPY 1/> REGIONS: CPT | Performed by: PHYSICAL THERAPIST

## 2019-04-17 PROCEDURE — 97110 THERAPEUTIC EXERCISES: CPT | Performed by: PHYSICAL THERAPIST

## 2019-04-17 PROCEDURE — 97035 APP MDLTY 1+ULTRASOUND EA 15: CPT | Performed by: PHYSICAL THERAPIST

## 2019-04-17 NOTE — PROGRESS NOTES
Physical Therapy Daily Progress Note    VISIT#: 4    Subjective   Jim Frances reports: that he is still having pain in the right buttocks with WB.  Has had occasional right anterior thigh pain, more often with sitting than standing.        Objective   Presents with antalgic gait on the right    Left iliac crest and PSIS elevated compared to the right, Right ASIS elevated    Tenderness in left SI, slight increased pain with PA glide of the L3& 4 spinous processes    Full pain free spinal motion      See Exercise, Manual, and Modality Logs for complete treatment.     Patient Education: causes of symptoms    Assessment/Plan  Pelvis much more level after manual treatment today.  Pain decreased after manual therapy as well.      Progress strengthening /stabilization /functional activity  Add hip abd/ext ex with tubing next visit if still feeling well         Manual Therapy:    10     mins  04078;  Therapeutic Exercise:    15     mins  23680;     Neuromuscular Jb:    0    mins  50084;    Therapeutic Activity:     5     mins  08094;   Patient education about anatomy using model    Timed Treatment:   38   mins   Total Treatment:     45   mins    Karie Clemente, PT  [place] License # [id number]  Physical Therapist

## 2019-04-20 DIAGNOSIS — I10 ESSENTIAL HYPERTENSION: ICD-10-CM

## 2019-04-22 ENCOUNTER — TREATMENT (OUTPATIENT)
Dept: PHYSICAL THERAPY | Facility: CLINIC | Age: 74
End: 2019-04-22

## 2019-04-22 DIAGNOSIS — M54.50 ACUTE RIGHT-SIDED LOW BACK PAIN WITHOUT SCIATICA: Primary | ICD-10-CM

## 2019-04-22 DIAGNOSIS — G57.01 PIRIFORMIS SYNDROME OF RIGHT SIDE: ICD-10-CM

## 2019-04-22 PROCEDURE — 97140 MANUAL THERAPY 1/> REGIONS: CPT | Performed by: PHYSICAL THERAPIST

## 2019-04-22 PROCEDURE — 97110 THERAPEUTIC EXERCISES: CPT | Performed by: PHYSICAL THERAPIST

## 2019-04-22 RX ORDER — LISINOPRIL 10 MG/1
TABLET ORAL
Qty: 90 TABLET | Refills: 0 | Status: SHIPPED | OUTPATIENT
Start: 2019-04-22 | End: 2019-05-22 | Stop reason: SDUPTHER

## 2019-04-22 NOTE — PROGRESS NOTES
Physical Therapy Daily Progress Note    VISIT#: 5    Subjective   Jim Frances reports: that he was feeling better after his last session.  States that the past few days he has had some increase in right LBP and some pain into the anterior right thigh.        Objective   Left iliac crest and PSIS elevated compared to the right    Symmetrical gait pattern upon presentation    See Exercise, Manual, and Modality Logs for complete treatment.     Patient Education: new core stability exercise for HEP - issued green tubing    Assessment/Plan  Patient with much more symmetrical gait upon presentation and improved pelvic position after treatment.   Some right anterior thigh pain intermittent but not while in clinic today    Progress strengthening /stabilization /functional activity  If still having anterior thigh pain and persistent back pain refer back to MD for further testing         Manual Therapy:    11     mins  61911;  Therapeutic Exercise:    25/35     mins  16563;     Neuromuscular Jb:    0    mins  01932;    Therapeutic Activity:     0     mins  30258;       Timed Treatment:   44   mins   Total Treatment:     60   mins    Karie Clemente, PT  [place] License # [id number]  Physical Therapist

## 2019-04-25 ENCOUNTER — TREATMENT (OUTPATIENT)
Dept: PHYSICAL THERAPY | Facility: CLINIC | Age: 74
End: 2019-04-25

## 2019-04-25 DIAGNOSIS — M54.50 ACUTE RIGHT-SIDED LOW BACK PAIN WITHOUT SCIATICA: Primary | ICD-10-CM

## 2019-04-25 DIAGNOSIS — G57.01 PIRIFORMIS SYNDROME OF RIGHT SIDE: ICD-10-CM

## 2019-04-25 PROCEDURE — 97110 THERAPEUTIC EXERCISES: CPT | Performed by: PHYSICAL THERAPIST

## 2019-04-25 PROCEDURE — 97530 THERAPEUTIC ACTIVITIES: CPT | Performed by: PHYSICAL THERAPIST

## 2019-04-25 NOTE — PROGRESS NOTES
Physical Therapy Daily Progress Note - Reassessment    VISIT#: 6    Subjective   Jim Frances reports: that he has good days and bad days.  States that the pain remains in the right posterior/lateral right hip pain and has some pain into the right thigh at intermittent times.  States that he took some indocin which seemed to decrease symptoms.  States that he is still quite stiff upon awakening.  Denies any radiation into the lower leg.        Objective - he presents ambulating with a normal gait pattern  Spinal AROM - full in all planes of motion with slight pain at end ranges of bilateral lateral flexion   Strength - slight decreased strength noted in right hip flexion and hamstrings   Sensation - intact   Palpation - increased pain with PA glides of the L3,4,5 spinous processes  Special tests - negative SLR and prone knee bend, negative resisted piriformis test, negative HipSscour and Fabers test  Activity tolerances - he is able to stand and walk for prolonged periods some day and other days has quite a bit of pain preventing activity.    Patient Education:    Assessment/Plan  Patient has demonstrated minimal/moderate improvement since the initiation of therapy.  The pain has  Decreased in intensity and frequency some days, but other days is still intense causing him to exhibit very antalgic movements.  The motion has returned to normal.  The activity tolerances have remained limited.  Due to the persistence of his buttpcks and anterior thigh pain, as well as the increased pain with PA glides of hte lumbar spinous processes,  I feel that he may benefit from further diagnostic testing. If you have any questions concerning the care, please do not hesitate to contact me.           Manual Therapy:    0     mins  99383;  Therapeutic Exercise:    25     mins  47673;     Neuromuscular Jb:    0    mins  79137;    Therapeutic Activity:     15     mins  97988;   Assessed for MD and discussed HEP    Timed Treatment:    40   mins   Total Treatment:     45   mins    Karie Clemente, PT  [place] License # [id number]  Physical Therapist

## 2019-04-26 ENCOUNTER — TELEPHONE (OUTPATIENT)
Dept: FAMILY MEDICINE CLINIC | Facility: CLINIC | Age: 74
End: 2019-04-26

## 2019-04-26 DIAGNOSIS — G57.01 PIRIFORMIS SYNDROME, RIGHT: Primary | ICD-10-CM

## 2019-04-26 DIAGNOSIS — M54.41 ACUTE BILATERAL LOW BACK PAIN WITH BILATERAL SCIATICA: ICD-10-CM

## 2019-04-26 DIAGNOSIS — M54.42 ACUTE BILATERAL LOW BACK PAIN WITH BILATERAL SCIATICA: ICD-10-CM

## 2019-04-26 NOTE — TELEPHONE ENCOUNTER
Reviewed PT note  Still with pain in lumbar and hip and radicular pain  Lets obtain lumbar xrays.  Let him know to come in to xray dept next week    Robby Henley MD

## 2019-05-01 ENCOUNTER — HOSPITAL ENCOUNTER (OUTPATIENT)
Dept: GENERAL RADIOLOGY | Facility: HOSPITAL | Age: 74
Discharge: HOME | End: 2019-05-01
Admitting: FAMILY MEDICINE

## 2019-05-01 DIAGNOSIS — M54.42 ACUTE BILATERAL LOW BACK PAIN WITH BILATERAL SCIATICA: ICD-10-CM

## 2019-05-01 DIAGNOSIS — M54.41 ACUTE BILATERAL LOW BACK PAIN WITH BILATERAL SCIATICA: ICD-10-CM

## 2019-05-01 DIAGNOSIS — G57.01 PIRIFORMIS SYNDROME, RIGHT: ICD-10-CM

## 2019-05-01 PROCEDURE — 72110 X-RAY EXAM L-2 SPINE 4/>VWS: CPT

## 2019-05-14 DIAGNOSIS — I10 BENIGN HYPERTENSION: ICD-10-CM

## 2019-05-14 DIAGNOSIS — C61 CANCER OF PROSTATE (HCC): Primary | ICD-10-CM

## 2019-05-14 DIAGNOSIS — E78.2 MIXED HYPERLIPIDEMIA: ICD-10-CM

## 2019-05-14 DIAGNOSIS — R53.83 FATIGUE, UNSPECIFIED TYPE: ICD-10-CM

## 2019-05-14 DIAGNOSIS — M1A.0710 CHRONIC GOUT OF RIGHT ANKLE, UNSPECIFIED CAUSE: ICD-10-CM

## 2019-05-16 LAB
ALBUMIN SERPL-MCNC: 4.1 G/DL (ref 3.5–5.2)
ALBUMIN/GLOB SERPL: 1.5 G/DL
ALP SERPL-CCNC: 80 U/L (ref 39–117)
ALT SERPL-CCNC: 16 U/L (ref 1–41)
AST SERPL-CCNC: 26 U/L (ref 1–40)
BILIRUB SERPL-MCNC: 0.2 MG/DL (ref 0.2–1.2)
BUN SERPL-MCNC: 19 MG/DL (ref 8–23)
BUN/CREAT SERPL: 17.6 (ref 7–25)
CALCIUM SERPL-MCNC: 9.8 MG/DL (ref 8.6–10.5)
CHLORIDE SERPL-SCNC: 102 MMOL/L (ref 98–107)
CHOLEST SERPL-MCNC: 169 MG/DL (ref 0–200)
CO2 SERPL-SCNC: 29.9 MMOL/L (ref 22–29)
CREAT SERPL-MCNC: 1.08 MG/DL (ref 0.76–1.27)
ERYTHROCYTE [DISTWIDTH] IN BLOOD BY AUTOMATED COUNT: 13.2 % (ref 12.3–15.4)
GLOBULIN SER CALC-MCNC: 2.7 GM/DL
GLUCOSE SERPL-MCNC: 112 MG/DL (ref 65–99)
HBA1C MFR BLD: 5.4 % (ref 4.8–5.6)
HCT VFR BLD AUTO: 42.6 % (ref 37.5–51)
HDLC SERPL-MCNC: 55 MG/DL (ref 40–60)
HGB BLD-MCNC: 13.3 G/DL (ref 13–17.7)
LDLC SERPL CALC-MCNC: 92 MG/DL (ref 0–100)
MCH RBC QN AUTO: 30.9 PG (ref 26.6–33)
MCHC RBC AUTO-ENTMCNC: 31.2 G/DL (ref 31.5–35.7)
MCV RBC AUTO: 99.1 FL (ref 79–97)
PLATELET # BLD AUTO: 147 10*3/MM3 (ref 140–450)
POTASSIUM SERPL-SCNC: 4.4 MMOL/L (ref 3.5–5.2)
PROT SERPL-MCNC: 6.8 G/DL (ref 6–8.5)
PSA SERPL-MCNC: 1.1 NG/ML (ref 0–4)
RBC # BLD AUTO: 4.3 10*6/MM3 (ref 4.14–5.8)
SODIUM SERPL-SCNC: 143 MMOL/L (ref 136–145)
TRIGL SERPL-MCNC: 112 MG/DL (ref 0–150)
TSH SERPL DL<=0.005 MIU/L-ACNC: 1.94 MIU/ML (ref 0.27–4.2)
URATE SERPL-MCNC: 7.4 MG/DL (ref 3.4–7)
VLDLC SERPL CALC-MCNC: 22.4 MG/DL
WBC # BLD AUTO: 4.47 10*3/MM3 (ref 3.4–10.8)
WRITTEN AUTHORIZATION: NORMAL

## 2019-05-22 ENCOUNTER — RESULTS ENCOUNTER (OUTPATIENT)
Dept: FAMILY MEDICINE CLINIC | Facility: CLINIC | Age: 74
End: 2019-05-22

## 2019-05-22 ENCOUNTER — OFFICE VISIT (OUTPATIENT)
Dept: FAMILY MEDICINE CLINIC | Facility: CLINIC | Age: 74
End: 2019-05-22

## 2019-05-22 VITALS
WEIGHT: 173 LBS | RESPIRATION RATE: 16 BRPM | OXYGEN SATURATION: 98 % | HEIGHT: 69 IN | SYSTOLIC BLOOD PRESSURE: 130 MMHG | TEMPERATURE: 98.9 F | BODY MASS INDEX: 25.62 KG/M2 | DIASTOLIC BLOOD PRESSURE: 70 MMHG | HEART RATE: 76 BPM

## 2019-05-22 DIAGNOSIS — M1A.0710 CHRONIC IDIOPATHIC GOUT INVOLVING TOE OF RIGHT FOOT WITHOUT TOPHUS: Primary | ICD-10-CM

## 2019-05-22 DIAGNOSIS — Z00.00 MEDICARE ANNUAL WELLNESS VISIT, SUBSEQUENT: ICD-10-CM

## 2019-05-22 DIAGNOSIS — F33.41 RECURRENT MAJOR DEPRESSIVE DISORDER, IN PARTIAL REMISSION (HCC): ICD-10-CM

## 2019-05-22 DIAGNOSIS — I10 ESSENTIAL HYPERTENSION: ICD-10-CM

## 2019-05-22 DIAGNOSIS — E78.2 MIXED HYPERLIPIDEMIA: ICD-10-CM

## 2019-05-22 DIAGNOSIS — Z12.11 SCREEN FOR COLON CANCER: ICD-10-CM

## 2019-05-22 DIAGNOSIS — Z23 NEED FOR VACCINATION FOR PNEUMOCOCCUS: ICD-10-CM

## 2019-05-22 DIAGNOSIS — Z85.46 PERSONAL HISTORY OF PROSTATE CANCER: ICD-10-CM

## 2019-05-22 PROCEDURE — G0009 ADMIN PNEUMOCOCCAL VACCINE: HCPCS | Performed by: FAMILY MEDICINE

## 2019-05-22 PROCEDURE — 90732 PPSV23 VACC 2 YRS+ SUBQ/IM: CPT | Performed by: FAMILY MEDICINE

## 2019-05-22 PROCEDURE — G0439 PPPS, SUBSEQ VISIT: HCPCS | Performed by: FAMILY MEDICINE

## 2019-05-22 RX ORDER — SERTRALINE HYDROCHLORIDE 100 MG/1
150 TABLET, FILM COATED ORAL DAILY
Qty: 135 TABLET | Refills: 3 | Status: SHIPPED | OUTPATIENT
Start: 2019-05-22 | End: 2020-06-08 | Stop reason: SDUPTHER

## 2019-05-22 RX ORDER — TAMSULOSIN HYDROCHLORIDE 0.4 MG/1
1 CAPSULE ORAL DAILY
Qty: 90 CAPSULE | Refills: 3 | Status: SHIPPED | OUTPATIENT
Start: 2019-05-22 | End: 2020-06-08 | Stop reason: SDUPTHER

## 2019-05-22 RX ORDER — LISINOPRIL 10 MG/1
10 TABLET ORAL DAILY
Qty: 90 TABLET | Refills: 3 | Status: SHIPPED | OUTPATIENT
Start: 2019-05-22 | End: 2020-06-08 | Stop reason: SDUPTHER

## 2019-05-22 RX ORDER — ALLOPURINOL 300 MG/1
300 TABLET ORAL DAILY
Qty: 90 TABLET | Refills: 3 | Status: SHIPPED | OUTPATIENT
Start: 2019-05-22 | End: 2020-06-08 | Stop reason: SDUPTHER

## 2019-05-22 NOTE — PROGRESS NOTES
QUICK REFERENCE INFORMATION:  The ABCs of the Annual Wellness Visit    Subsequent Medicare Wellness Visit     HEALTH RISK ASSESSMENT    : 1945    Recent Hospitalizations:  No hospitalization(s) within the last year..  ccc      Current Medical Providers:  Patient Care Team:  Robby Henley MD as PCP - General (Family Medicine)  Matheus Dexter MD as PCP - Claims Attributed  Matheus Dexter MD as Consulting Physician (Pulmonary Disease)  Igor Miller Jr., MD as Consulting Physician (Urology)        Smoking Status:  Social History     Tobacco Use   Smoking Status Light Tobacco Smoker   • Types: Cigars   Smokeless Tobacco Never Used   Tobacco Comment    1 per mo       Alcohol Consumption:  Social History     Substance and Sexual Activity   Alcohol Use Yes   • Alcohol/week: 8.4 oz   • Types: 14 Shots of liquor per week    Comment: 2 drinks a day        Depression Screen:   PHQ-2/PHQ-9 Depression Screening 2019   Little interest or pleasure in doing things 0   Feeling down, depressed, or hopeless 0   Trouble falling or staying asleep, or sleeping too much 0   Feeling tired or having little energy 0   Poor appetite or overeating 0   Feeling bad about yourself - or that you are a failure or have let yourself or your family down 0   Trouble concentrating on things, such as reading the newspaper or watching television 1   Moving or speaking so slowly that other people could have noticed. Or the opposite - being so fidgety or restless that you have been moving around a lot more than usual 0   Thoughts that you would be better off dead, or of hurting yourself in some way 0   Total Score 1       Health Habits and Functional and Cognitive Screening:  Functional & Cognitive Status 2019   Do you have difficulty preparing food and eating? No   Do you have difficulty bathing yourself, getting dressed or grooming yourself? No   Do you have difficulty using the toilet? No   Do you have difficulty  moving around from place to place? No   Do you have trouble with steps or getting out of a bed or a chair? No   In the past year have you fallen or experienced a near fall? Yes   Current Diet Well Balanced Diet   Dental Exam Up to date   Eye Exam Not up to date   Exercise (times per week) 0 times per week   Current Exercise Activities Include -   Do you need help using the phone?  No   Are you deaf or do you have serious difficulty hearing?  No   Do you need help with transportation? No   Do you need help shopping? No   Do you need help preparing meals?  No   Do you need help with housework?  No   Do you need help with laundry? No   Do you need help taking your medications? No   Do you need help managing money? No   Do you ever drive or ride in a car without wearing a seat belt? No   Have you felt unusual stress, anger or loneliness in the last month? No   Who do you live with? Spouse   If you need help, do you have trouble finding someone available to you? No   Have you been bothered in the last four weeks by sexual problems? -   Do you have difficulty concentrating, remembering or making decisions? Yes           Does the patient have evidence of cognitive impairment? No    Asiprin use counseling: Does not need ASA (and currently is not on it)      Recent Lab Results:    Lab Results   Component Value Date     (H) 05/15/2019     Lab Results   Component Value Date    HGBA1C 5.40 05/15/2019     Lab Results   Component Value Date    TRIG 112 05/15/2019    HDL 55 05/15/2019    VLDL 22.4 05/15/2019    LDLHDL 1.90 04/18/2018           Age-appropriate Screening Schedule:  Refer to the list below for future screening recommendations based on patient's age, sex and/or medical conditions. Orders for these recommended tests are listed in the plan section. The patient has been provided with a written plan.    Health Maintenance   Topic Date Due   • ZOSTER VACCINE (1 of 2) 10/16/1995   • PNEUMOCOCCAL VACCINES (65+  LOW/MEDIUM RISK) (2 of 2 - PPSV23) 04/25/2019   • INFLUENZA VACCINE  08/01/2019   • LIPID PANEL  05/15/2020   • COLONOSCOPY  06/15/2025   • TDAP/TD VACCINES (2 - Td) 01/11/2027        Subjective   History of Present Illness    Jim Frances is a 73 y.o. male who presents for an Annual Wellness Visit.    The following portions of the patient's history were reviewed and updated as appropriate: allergies, current medications, past family history, past medical history, past social history, past surgical history and problem list.    Outpatient Medications Prior to Visit   Medication Sig Dispense Refill   • modafinil (PROVIGIL) 200 MG tablet Take 200 mg by mouth 2 (Two) Times a Day.     • Multiple Vitamin (MULTI-VITAMIN DAILY PO) Take  by mouth.     • Sodium Oxybate (XYREM) 500 MG/ML solution 4.5 gm diluted in 60 ml of water at 11pm and 2am. 3 bottle 1   • allopurinol (ZYLOPRIM) 100 MG tablet TAKE 2 TABLETS DAILY (PRIMARY GOUT) 180 tablet 3   • cholecalciferol (VITAMIN D3) 1000 UNITS tablet Take 1,000 Units by mouth Daily.     • lisinopril (PRINIVIL,ZESTRIL) 10 MG tablet TAKE 1 TABLET DAILY 90 tablet 0   • sertraline (ZOLOFT) 100 MG tablet Take 1 tablet by mouth Daily. 90 tablet 3   • tamsulosin (FLOMAX) 0.4 MG capsule 24 hr capsule TAKE 1 CAPSULE DAILY 90 capsule 1   • Omega-3 Fatty Acids (FISH OIL) 1000 MG capsule capsule Take  by mouth Daily With Breakfast.       No facility-administered medications prior to visit.        Patient Active Problem List   Diagnosis   • Personal history of prostate cancer   • Narcolepsy without cataplexy   • Essential hypertension   • Chronic idiopathic gout involving toe of right foot without tophus   • Recurrent major depressive disorder, in partial remission (CMS/HCC)   • Mixed hyperlipidemia   • Obstructive sleep apnea syndrome   • Piriformis syndrome, right   • Lateral knee pain, left   • Medicare annual wellness visit, subsequent       Advance Care Planning:  Patient has an advance  "directive - a copy has not been provided. Have asked the patient to send this to us to add to record    Identification of Risk Factors:  Risk factors include: caretaker stress.    Review of Systems    Compared to one year ago, the patient feels his physical health is the same.  Compared to one year ago, the patient feels his mental health is the same.    Objective     Physical Exam   Constitutional: He appears well-developed and well-nourished.   Cardiovascular: Normal rate.   Pulmonary/Chest: Effort normal.   Neurological: He is alert.   Psychiatric: He has a normal mood and affect.   Nursing note and vitals reviewed.      Vitals:    05/22/19 0856   BP: 130/70   Pulse: 76   Resp: 16   Temp: 98.9 °F (37.2 °C)   SpO2: 98%   Weight: 78.5 kg (173 lb)   Height: 175.3 cm (69\")   PainSc:   2   PainLoc: Hip       Patient's Body mass index is 25.55 kg/m². BMI is within normal parameters. No follow-up required..      Assessment/Plan   Patient Self-Management and Personalized Health Advice  The patient has been provided with information about: diet, exercise and mental health concerns and preventive services including:   · Colorectal cancer screening, cologuard test ordered, Diabetes screening, see lab orders, Pneumococcal vaccine , Prostate cancer screening discussed, Zostavax vaccine (Herpes Zoster).    Visit Diagnoses:    ICD-10-CM ICD-9-CM   1. Chronic idiopathic gout involving toe of right foot without tophus M1A.0710 274.02   2. Essential hypertension I10 401.9   3. Medicare annual wellness visit, subsequent Z00.00 V70.0   4. Mixed hyperlipidemia E78.2 272.2   5. Recurrent major depressive disorder, in partial remission (CMS/[facility]) F33.41 296.35   6. Personal history of prostate cancer Z85.46 V10.46   7. Need for vaccination for pneumococcus Z23 V03.82   8. Screen for colon cancer Z12.11 V76.51       Orders Placed This Encounter   Procedures   • Pneumococcal polysaccharide vaccine 23-valent, adult, subcutaneous/IM   • " Cologuard - Stool, Per Rectum     Standing Status:   Future     Standing Expiration Date:   5/22/2020       Outpatient Encounter Medications as of 5/22/2019   Medication Sig Dispense Refill   • allopurinol (ZYLOPRIM) 300 MG tablet Take 1 tablet by mouth Daily. 90 tablet 3   • lisinopril (PRINIVIL,ZESTRIL) 10 MG tablet Take 1 tablet by mouth Daily. 90 tablet 3   • modafinil (PROVIGIL) 200 MG tablet Take 200 mg by mouth 2 (Two) Times a Day.     • Multiple Vitamin (MULTI-VITAMIN DAILY PO) Take  by mouth.     • sertraline (ZOLOFT) 100 MG tablet Take 1.5 tablets by mouth Daily for 90 days. 135 tablet 3   • Sodium Oxybate (XYREM) 500 MG/ML solution 4.5 gm diluted in 60 ml of water at 11pm and 2am. 3 bottle 1   • tamsulosin (FLOMAX) 0.4 MG capsule 24 hr capsule Take 1 capsule by mouth Daily. 90 capsule 3   • [DISCONTINUED] allopurinol (ZYLOPRIM) 100 MG tablet TAKE 2 TABLETS DAILY (PRIMARY GOUT) 180 tablet 3   • [DISCONTINUED] cholecalciferol (VITAMIN D3) 1000 UNITS tablet Take 1,000 Units by mouth Daily.     • [DISCONTINUED] lisinopril (PRINIVIL,ZESTRIL) 10 MG tablet TAKE 1 TABLET DAILY 90 tablet 0   • [DISCONTINUED] sertraline (ZOLOFT) 100 MG tablet Take 1 tablet by mouth Daily. 90 tablet 3   • [DISCONTINUED] tamsulosin (FLOMAX) 0.4 MG capsule 24 hr capsule TAKE 1 CAPSULE DAILY 90 capsule 1   • Omega-3 Fatty Acids (FISH OIL) 1000 MG capsule capsule Take  by mouth Daily With Breakfast.     • Zoster Vac Recomb Adjuvanted (SHINGRIX) 50 MCG/0.5ML reconstituted suspension Inject 50 mcg into the appropriate muscle as directed by prescriber Every 6 (Six) Months for 2 doses. 1 each 1     No facility-administered encounter medications on file as of 5/22/2019.        Reviewed use of high risk medication in the elderly: yes  Reviewed for potential of harmful drug interactions in the elderly: yes   2 gout flairs this year, lets inc allopurinol to 300  Depression is better on 150 of Zoloft  Labs ok  Due for vaccines  BESSIE working  well  Reviewed his LBP and xrays and progress in PT    Follow Up:  Return in about 6 months (around 11/22/2019) for blood pressure.     An After Visit Summary and PPPS with all of these plans were given to the patient.

## 2019-11-20 ENCOUNTER — OFFICE VISIT (OUTPATIENT)
Dept: FAMILY MEDICINE CLINIC | Facility: CLINIC | Age: 74
End: 2019-11-20

## 2019-11-20 VITALS
HEIGHT: 69 IN | HEART RATE: 80 BPM | SYSTOLIC BLOOD PRESSURE: 138 MMHG | WEIGHT: 174 LBS | DIASTOLIC BLOOD PRESSURE: 80 MMHG | OXYGEN SATURATION: 99 % | BODY MASS INDEX: 25.77 KG/M2

## 2019-11-20 DIAGNOSIS — I10 ESSENTIAL HYPERTENSION: Primary | ICD-10-CM

## 2019-11-20 DIAGNOSIS — M1A.0710 CHRONIC IDIOPATHIC GOUT INVOLVING TOE OF RIGHT FOOT WITHOUT TOPHUS: ICD-10-CM

## 2019-11-20 DIAGNOSIS — Z23 FLU VACCINE NEED: ICD-10-CM

## 2019-11-20 DIAGNOSIS — F33.41 RECURRENT MAJOR DEPRESSIVE DISORDER, IN PARTIAL REMISSION (HCC): ICD-10-CM

## 2019-11-20 PROBLEM — M25.562 LATERAL KNEE PAIN, LEFT: Status: RESOLVED | Noted: 2018-03-26 | Resolved: 2019-11-20

## 2019-11-20 PROBLEM — G47.52 REM BEHAVIORAL DISORDER: Status: ACTIVE | Noted: 2019-11-18

## 2019-11-20 PROBLEM — G47.411 NARCOLEPSY WITH CATAPLEXY: Status: ACTIVE | Noted: 2019-04-29

## 2019-11-20 PROBLEM — G57.01 PIRIFORMIS SYNDROME, RIGHT: Status: RESOLVED | Noted: 2018-03-26 | Resolved: 2019-11-20

## 2019-11-20 PROCEDURE — 99213 OFFICE O/P EST LOW 20 MIN: CPT | Performed by: FAMILY MEDICINE

## 2019-11-20 PROCEDURE — 90674 CCIIV4 VAC NO PRSV 0.5 ML IM: CPT | Performed by: FAMILY MEDICINE

## 2019-11-20 PROCEDURE — G0008 ADMIN INFLUENZA VIRUS VAC: HCPCS | Performed by: FAMILY MEDICINE

## 2019-11-20 RX ORDER — CHOLECALCIFEROL (VITAMIN D3) 125 MCG
5 CAPSULE ORAL
COMMUNITY

## 2019-11-20 NOTE — PROGRESS NOTES
"  Chief Complaint   Patient presents with   • Hypertension       Subjective     Patient here for follow-up of elevated blood pressure.    He is not exercising and is adherent to a low-salt diet.    Blood pressure is well controlled at home.   Cardiac symptoms: none.   Patient denies: chest pressure/discomfort, claudication, cough, dyspnea, exertional chest pressure/discomfort, irregular heart beat, lower extremity edema, near-syncope, orthopnea, palpitations, paroxysmal nocturnal dyspnea, syncope, tachypnea and weight gain.   Cardiovascular risk factors: advanced age (older than 55 for men, 65 for women), hypertension and male gender.   Use of agents associated with hypertension: amphetamines.   History of target organ damage: none.  Patient is taking prescribed hypertension medications as prescribed without side effects.    The following portions of the patient's history were reviewed and updated as appropriate: allergies, current medications, past family history, past medical history, past social history, past surgical history and problem list.    Review of Systems  A comprehensive review of systems was negative except for: Behavioral/Psych: positive for sleep disturbance         Vitals:    11/20/19 0915   BP: 138/80   BP Location: Left arm   Patient Position: Sitting   Cuff Size: Adult   Pulse: 80   SpO2: 99%   Weight: 78.9 kg (174 lb)   Height: 175.3 cm (69\")     BP Readings from Last 3 Encounters:   11/20/19 138/80   05/22/19 130/70   10/31/18 112/72     Objective      Gen: alert, pleasant.  HEENT: PERRL, EOMI intact, lids ok, ear canals clear, TMs normal, throat clear, nostrils normal  Neck: no bruit, no enlarged thyroid  Lungs: CTA  Heart: RR no murmur  ABD: soft , + BS  Pulses: intact  Mood: stable     Assessment/Plan   Hypertension, normal blood pressure Evidence of target organ damage: none.    Jim was seen today for hypertension.    Diagnoses and all orders for this visit:    Essential " hypertension    Recurrent major depressive disorder, in partial remission (CMS/[facility])    Chronic idiopathic gout involving toe of right foot without tophus    Flu vaccine need  -     Flucelvax Quad=>4Years ([id number])    no gout flairs    Mood stable on Zoloft.    Medication: no change.  Follow up: 6 months and as needed.    There are no Patient Instructions on file for this visit.  There are no discontinued medications.     Return in about 6 months (around 5/20/2020) for Medicare Wellness visit.    Limit salt  Limit alcoholic drinks to 1 a day  Limit caffeine to 1-2 servings a day    Dr. Robby Henley MD  [place], [place]  [facility].

## 2020-04-03 RX ORDER — INDOMETHACIN 50 MG/1
50 CAPSULE ORAL 3 TIMES DAILY PRN
Qty: 30 CAPSULE | Refills: 0 | Status: SHIPPED | OUTPATIENT
Start: 2020-04-03 | End: 2021-06-11 | Stop reason: SDUPTHER

## 2020-05-27 ENCOUNTER — TELEPHONE (OUTPATIENT)
Dept: FAMILY MEDICINE CLINIC | Facility: CLINIC | Age: 75
End: 2020-05-27

## 2020-05-27 DIAGNOSIS — E78.2 MIXED HYPERLIPIDEMIA: ICD-10-CM

## 2020-05-27 DIAGNOSIS — I10 ESSENTIAL HYPERTENSION: Primary | ICD-10-CM

## 2020-05-27 DIAGNOSIS — I10 ESSENTIAL HYPERTENSION: ICD-10-CM

## 2020-05-27 DIAGNOSIS — Z12.5 SCREENING FOR PROSTATE CANCER: ICD-10-CM

## 2020-05-27 DIAGNOSIS — M1A.0710 CHRONIC IDIOPATHIC GOUT INVOLVING TOE OF RIGHT FOOT WITHOUT TOPHUS: ICD-10-CM

## 2020-05-28 LAB
ALBUMIN SERPL-MCNC: 4.6 G/DL (ref 3.5–5.2)
ALBUMIN/GLOB SERPL: 1.8 G/DL
ALP SERPL-CCNC: 79 U/L (ref 39–117)
ALT SERPL-CCNC: 30 U/L (ref 1–41)
APPEARANCE UR: CLEAR
AST SERPL-CCNC: 32 U/L (ref 1–40)
BILIRUB SERPL-MCNC: 0.3 MG/DL (ref 0.2–1.2)
BILIRUB UR QL STRIP: NEGATIVE
BUN SERPL-MCNC: 28 MG/DL (ref 8–23)
BUN/CREAT SERPL: 23.9 (ref 7–25)
CALCIUM SERPL-MCNC: 9.5 MG/DL (ref 8.6–10.5)
CHLORIDE SERPL-SCNC: 99 MMOL/L (ref 98–107)
CHOLEST SERPL-MCNC: 155 MG/DL (ref 0–200)
CO2 SERPL-SCNC: 34.6 MMOL/L (ref 22–29)
COLOR UR: YELLOW
CREAT SERPL-MCNC: 1.17 MG/DL (ref 0.76–1.27)
ERYTHROCYTE [DISTWIDTH] IN BLOOD BY AUTOMATED COUNT: 13.1 % (ref 12.3–15.4)
GLOBULIN SER CALC-MCNC: 2.5 GM/DL
GLUCOSE SERPL-MCNC: 108 MG/DL (ref 65–99)
GLUCOSE UR QL: NEGATIVE
HCT VFR BLD AUTO: 39.2 % (ref 37.5–51)
HDLC SERPL-MCNC: 52 MG/DL (ref 40–60)
HGB BLD-MCNC: 13.1 G/DL (ref 13–17.7)
HGB UR QL STRIP: NEGATIVE
KETONES UR QL STRIP: NEGATIVE
LDLC SERPL CALC-MCNC: 91 MG/DL (ref 0–100)
LEUKOCYTE ESTERASE UR QL STRIP: NEGATIVE
MCH RBC QN AUTO: 30.4 PG (ref 26.6–33)
MCHC RBC AUTO-ENTMCNC: 33.4 G/DL (ref 31.5–35.7)
MCV RBC AUTO: 91 FL (ref 79–97)
NITRITE UR QL STRIP: NEGATIVE
PH UR STRIP: 7 [PH] (ref 5–8)
PLATELET # BLD AUTO: 140 10*3/MM3 (ref 140–450)
POTASSIUM SERPL-SCNC: 4.8 MMOL/L (ref 3.5–5.2)
PROT SERPL-MCNC: 7.1 G/DL (ref 6–8.5)
PROT UR QL STRIP: NEGATIVE
PSA SERPL-MCNC: 1.2 NG/ML (ref 0–4)
RBC # BLD AUTO: 4.31 10*6/MM3 (ref 4.14–5.8)
SODIUM SERPL-SCNC: 140 MMOL/L (ref 136–145)
SP GR UR: 1.02 (ref 1–1.03)
TRIGL SERPL-MCNC: 62 MG/DL (ref 0–150)
TSH SERPL DL<=0.005 MIU/L-ACNC: 2.16 UIU/ML (ref 0.27–4.2)
URATE SERPL-MCNC: 5.2 MG/DL (ref 3.4–7)
UROBILINOGEN UR STRIP-MCNC: NORMAL MG/DL
VLDLC SERPL CALC-MCNC: 12.4 MG/DL (ref 5–40)
WBC # BLD AUTO: 4.86 10*3/MM3 (ref 3.4–10.8)

## 2020-06-08 ENCOUNTER — OFFICE VISIT (OUTPATIENT)
Dept: FAMILY MEDICINE CLINIC | Facility: CLINIC | Age: 75
End: 2020-06-08

## 2020-06-08 VITALS
DIASTOLIC BLOOD PRESSURE: 70 MMHG | OXYGEN SATURATION: 99 % | HEART RATE: 61 BPM | WEIGHT: 169.7 LBS | BODY MASS INDEX: 25.13 KG/M2 | SYSTOLIC BLOOD PRESSURE: 130 MMHG | HEIGHT: 69 IN

## 2020-06-08 DIAGNOSIS — Z85.46 PERSONAL HISTORY OF PROSTATE CANCER: ICD-10-CM

## 2020-06-08 DIAGNOSIS — I10 ESSENTIAL HYPERTENSION: ICD-10-CM

## 2020-06-08 DIAGNOSIS — Z00.00 MEDICARE ANNUAL WELLNESS VISIT, SUBSEQUENT: Primary | ICD-10-CM

## 2020-06-08 DIAGNOSIS — F33.41 RECURRENT MAJOR DEPRESSIVE DISORDER, IN PARTIAL REMISSION (HCC): ICD-10-CM

## 2020-06-08 DIAGNOSIS — M1A.0710 CHRONIC IDIOPATHIC GOUT INVOLVING TOE OF RIGHT FOOT WITHOUT TOPHUS: ICD-10-CM

## 2020-06-08 PROCEDURE — G0439 PPPS, SUBSEQ VISIT: HCPCS | Performed by: FAMILY MEDICINE

## 2020-06-08 RX ORDER — LISINOPRIL 10 MG/1
10 TABLET ORAL DAILY
Qty: 90 TABLET | Refills: 3 | Status: SHIPPED | OUTPATIENT
Start: 2020-06-08 | End: 2021-07-09

## 2020-06-08 RX ORDER — TAMSULOSIN HYDROCHLORIDE 0.4 MG/1
1 CAPSULE ORAL DAILY
Qty: 90 CAPSULE | Refills: 3 | Status: SHIPPED | OUTPATIENT
Start: 2020-06-08 | End: 2021-09-01

## 2020-06-08 RX ORDER — SERTRALINE HYDROCHLORIDE 100 MG/1
150 TABLET, FILM COATED ORAL DAILY
Qty: 135 TABLET | Refills: 3 | Status: SHIPPED | OUTPATIENT
Start: 2020-06-08 | End: 2021-08-13

## 2020-06-08 RX ORDER — FLUTICASONE PROPIONATE 0.05 MG/G
OINTMENT TOPICAL
COMMUNITY
Start: 2020-04-06

## 2020-06-08 RX ORDER — DEXTROAMPHETAMINE SACCHARATE, AMPHETAMINE ASPARTATE, DEXTROAMPHETAMINE SULFATE AND AMPHETAMINE SULFATE 2.5; 2.5; 2.5; 2.5 MG/1; MG/1; MG/1; MG/1
10 TABLET ORAL
COMMUNITY
Start: 2020-05-26

## 2020-06-08 RX ORDER — ALLOPURINOL 300 MG/1
300 TABLET ORAL DAILY
Qty: 90 TABLET | Refills: 3 | Status: SHIPPED | OUTPATIENT
Start: 2020-06-08 | End: 2021-10-12

## 2020-06-08 NOTE — PATIENT INSTRUCTIONS
Results for orders placed or performed in visit on 05/27/20   Urinalysis With Microscopic If Indicated (No Culture) - Urine, Clean Catch   Result Value Ref Range    Specific Gravity, UA 1.020 1.005 - 1.030    pH, UA 7.0 5.0 - 8.0    Color, UA Yellow     Appearance, UA Clear Clear    Leukocytes, UA Negative Negative    Protein Negative Negative    Glucose, UA Negative Negative    Ketones Negative Negative    Blood, UA Negative Negative    Bilirubin, UA Negative Negative    Urobilinogen, UA Comment     Nitrite, UA Negative Negative   Uric Acid   Result Value Ref Range    Uric Acid 5.2 3.4 - 7.0 mg/dL   TSH   Result Value Ref Range    TSH 2.160 0.270 - 4.200 uIU/mL   PSA Screen   Result Value Ref Range    PSA 1.200 0.000 - 4.000 ng/mL   Lipid Panel   Result Value Ref Range    Total Cholesterol 155 0 - 200 mg/dL    Triglycerides 62 0 - 150 mg/dL    HDL Cholesterol 52 40 - 60 mg/dL    VLDL Cholesterol 12.4 5 - 40 mg/dL    LDL Cholesterol  91 0 - 100 mg/dL   Comprehensive Metabolic Panel   Result Value Ref Range    Glucose 108 (H) 65 - 99 mg/dL    BUN 28 (H) 8 - 23 mg/dL    Creatinine 1.17 0.76 - 1.27 mg/dL    eGFR Non African Am 61 >60 mL/min/1.73    eGFR African Am 74 >60 mL/min/1.73    BUN/Creatinine Ratio 23.9 7.0 - 25.0    Sodium 140 136 - 145 mmol/L    Potassium 4.8 3.5 - 5.2 mmol/L    Chloride 99 98 - 107 mmol/L    Total CO2 34.6 (H) 22.0 - 29.0 mmol/L    Calcium 9.5 8.6 - 10.5 mg/dL    Total Protein 7.1 6.0 - 8.5 g/dL    Albumin 4.60 3.50 - 5.20 g/dL    Globulin 2.5 gm/dL    A/G Ratio 1.8 g/dL    Total Bilirubin 0.3 0.2 - 1.2 mg/dL    Alkaline Phosphatase 79 39 - 117 U/L    AST (SGOT) 32 1 - 40 U/L    ALT (SGPT) 30 1 - 41 U/L   CBC (No Diff)   Result Value Ref Range    WBC 4.86 3.40 - 10.80 10*3/mm3    RBC 4.31 4.14 - 5.80 10*6/mm3    Hemoglobin 13.1 13.0 - 17.7 g/dL    Hematocrit 39.2 37.5 - 51.0 %    MCV 91.0 79.0 - 97.0 fL    MCH 30.4 26.6 - 33.0 pg    MCHC 33.4 31.5 - 35.7 g/dL    RDW 13.1 12.3 - 15.4 %     Platelets 140 140 - 450 10*3/mm3

## 2020-06-08 NOTE — PROGRESS NOTES
The ABCs of the Annual Wellness Visit  Subsequent Medicare Wellness Visit    Chief Complaint   Patient presents with   • Medicare Wellness-subsequent       Subjective   History of Present Illness:  Jim Frances is a 74 y.o. male who presents for a Subsequent Medicare Wellness Visit.    HEALTH RISK ASSESSMENT    Recent Hospitalizations:  No hospitalization(s) within the last year.    Current Medical Providers:  Patient Care Team:  Robby Henley MD as PCP - General (Family Medicine)  Matheus Dexter MD as PCP - Claims Attributed  Matheus Dexter MD as Consulting Physician (Pulmonary Disease)  Igor Miller Jr., MD as Consulting Physician (Urology)    Smoking Status:  Social History     Tobacco Use   Smoking Status Light Tobacco Smoker   • Types: Cigars   Smokeless Tobacco Never Used   Tobacco Comment    1 per mo       Alcohol Consumption:  Social History     Substance and Sexual Activity   Alcohol Use Yes   • Alcohol/week: 14.0 standard drinks   • Types: 14 Shots of liquor per week   • Frequency: 4 or more times a week   • Drinks per session: 1 or 2    Comment: 2 drinks a day        Depression Screen:   PHQ-2/PHQ-9 Depression Screening 6/8/2020   Little interest or pleasure in doing things 0   Feeling down, depressed, or hopeless 0   Trouble falling or staying asleep, or sleeping too much 0   Feeling tired or having little energy 0   Poor appetite or overeating 0   Feeling bad about yourself - or that you are a failure or have let yourself or your family down 0   Trouble concentrating on things, such as reading the newspaper or watching television 1   Moving or speaking so slowly that other people could have noticed. Or the opposite - being so fidgety or restless that you have been moving around a lot more than usual 0   Thoughts that you would be better off dead, or of hurting yourself in some way 0   Total Score 1       Fall Risk Screen:  JOEYADI Fall Risk Assessment has not been  completed.    Health Habits and Functional and Cognitive Screening:  Functional & Cognitive Status 6/8/2020   Do you have difficulty preparing food and eating? No   Do you have difficulty bathing yourself, getting dressed or grooming yourself? No   Do you have difficulty using the toilet? No   Do you have difficulty moving around from place to place? No   Do you have trouble with steps or getting out of a bed or a chair? No   Current Diet Well Balanced Diet   Dental Exam Up to date   Eye Exam Up to date   Exercise (times per week) 0 times per week   Current Exercise Activities Include None   Do you need help using the phone?  No   Are you deaf or do you have serious difficulty hearing?  No   Do you need help with transportation? No   Do you need help shopping? No   Do you need help preparing meals?  No   Do you need help with housework?  No   Do you need help with laundry? No   Do you need help taking your medications? No   Do you need help managing money? No   Do you ever drive or ride in a car without wearing a seat belt? No   Have you felt unusual stress, anger or loneliness in the last month? No   Who do you live with? Spouse   If you need help, do you have trouble finding someone available to you? No   Have you been bothered in the last four weeks by sexual problems? No   Do you have difficulty concentrating, remembering or making decisions? Yes         Does the patient have evidence of cognitive impairment? No    Asprin use counseling:Does not need ASA (and currently is not on it)    Age-appropriate Screening Schedule:  Refer to the list below for future screening recommendations based on patient's age, sex and/or medical conditions. Orders for these recommended tests are listed in the plan section. The patient has been provided with a written plan.    Health Maintenance   Topic Date Due   • ZOSTER VACCINE (1 of 2) 10/16/1995   • INFLUENZA VACCINE  08/01/2020   • LIPID PANEL  05/28/2021   • COLONOSCOPY   06/15/2025   • TDAP/TD VACCINES (2 - Td) 01/11/2027          The following portions of the patient's history were reviewed and updated as appropriate: allergies, current medications, past family history, past medical history, past social history, past surgical history and problem list.    Outpatient Medications Prior to Visit   Medication Sig Dispense Refill   • amphetamine-dextroamphetamine (ADDERALL) 10 MG tablet Take 10 mg by mouth.     • fluticasone (CUTIVATE) 0.005 % ointment      • indomethacin (INDOCIN) 50 MG capsule Take 1 capsule by mouth 3 (Three) Times a Day As Needed for Mild Pain . 30 capsule 0   • Multiple Vitamin (MULTI-VITAMIN DAILY PO) Take  by mouth.     • Sodium Oxybate (XYREM) 500 MG/ML solution 4.5 gm diluted in 60 ml of water at 11pm and 2am. 3 bottle 1   • allopurinol (ZYLOPRIM) 300 MG tablet Take 1 tablet by mouth Daily. 90 tablet 3   • lisinopril (PRINIVIL,ZESTRIL) 10 MG tablet Take 1 tablet by mouth Daily. 90 tablet 3   • sertraline (ZOLOFT) 100 MG tablet Take 1.5 tablets by mouth Daily for 90 days. 135 tablet 3   • tamsulosin (FLOMAX) 0.4 MG capsule 24 hr capsule Take 1 capsule by mouth Daily. 90 capsule 3   • melatonin 5 MG tablet tablet Take 5 mg by mouth.     • Omega-3 Fatty Acids (FISH OIL) 1000 MG capsule capsule Take  by mouth Daily With Breakfast.     • modafinil (PROVIGIL) 200 MG tablet Take 200 mg by mouth 2 (Two) Times a Day.       No facility-administered medications prior to visit.        Patient Active Problem List   Diagnosis   • Personal history of prostate cancer   • Narcolepsy without cataplexy   • Essential hypertension   • Chronic idiopathic gout involving toe of right foot without tophus   • Recurrent major depressive disorder, in partial remission (CMS/HCC)   • Mixed hyperlipidemia   • Obstructive sleep apnea syndrome   • Medicare annual wellness visit, subsequent   • Narcolepsy with cataplexy   • REM behavioral disorder   • Screening for prostate cancer   • Prostate  "cancer (CMS/[facility])       Advanced Care Planning:  ACP discussion was held with the patient during this visit. Patient has an advance directive (not in EMR), copy requested.    Review of Systems    Compared to one year ago, the patient feels his physical health is the same.  Compared to one year ago, the patient feels his mental health is the same.    Reviewed chart for potential of high risk medication in the elderly: yes  Reviewed chart for potential of harmful drug interactions in the elderly:yes    Objective         Vitals:    06/08/20 1031   BP: 130/70   BP Location: Left arm   Patient Position: Sitting   Cuff Size: Adult   Pulse: 61   SpO2: 99%   Weight: 77 kg (169 lb 11.2 oz)   Height: 175.3 cm (69\")       Body mass index is 25.06 kg/m².  Discussed the patient's BMI with him. The BMI is in the acceptable range.    Physical Exam   Constitutional: He appears well-developed and well-nourished.   HENT:   Mouth/Throat: Oropharynx is clear and moist.   Cardiovascular: Normal rate.   Pulmonary/Chest: Effort normal.   Neurological: He is alert.   Psychiatric: He has a normal mood and affect.   Nursing note and vitals reviewed.      Lab Results   Component Value Date     (H) 05/28/2020    CHLPL 155 05/28/2020    TRIG 62 05/28/2020    HDL 52 05/28/2020    LDL 91 05/28/2020    VLDL 12.4 05/28/2020        Assessment/Plan   Medicare Risks and Personalized Health Plan  CMS Preventative Services Quick Reference  Cardiovascular risk  Colon Cancer Screening  Dementia/Memory   Depression/Dysphoria  Diabetic Lab Screening   Immunizations Discussed/Encouraged (specific immunizations; Shingrix )  Prostate Cancer Screening     The above risks/problems have been discussed with the patient.  Pertinent information has been shared with the patient in the After Visit Summary.  Follow up plans and orders are seen below in the Assessment/Plan Section.    Diagnoses and all orders for this visit:    1. Medicare annual wellness visit, " subsequent (Primary)    2. Essential hypertension  -     lisinopril (PRINIVIL,ZESTRIL) 10 MG tablet; Take 1 tablet by mouth Daily. Indications: High Blood Pressure Disorder  Dispense: 90 tablet; Refill: 3    3. Chronic idiopathic gout involving toe of right foot without tophus  -     allopurinol (ZYLOPRIM) 300 MG tablet; Take 1 tablet by mouth Daily.  Dispense: 90 tablet; Refill: 3    4. Recurrent major depressive disorder, in partial remission (CMS/HCC)  -     sertraline (ZOLOFT) 100 MG tablet; Take 1.5 tablets by mouth Daily for 90 days. Indications: Major Depressive Disorder  Dispense: 135 tablet; Refill: 3    5. Personal history of prostate cancer  -     tamsulosin (FLOMAX) 0.4 MG capsule 24 hr capsule; Take 1 capsule by mouth Daily.  Dispense: 90 capsule; Refill: 3    Other orders  -     Zoster Vac Recomb Adjuvanted (Shingrix) 50 MCG/0.5ML reconstituted suspension; Inject 0.5 mL into the appropriate muscle as directed by prescriber Every 6 (Six) Months for 2 doses.  Dispense: 1 each; Refill: 1      Follow Up:  Return in about 6 months (around 12/8/2020).     An After Visit Summary and PPPS were given to the patient.

## 2020-12-02 ENCOUNTER — OFFICE VISIT (OUTPATIENT)
Dept: FAMILY MEDICINE CLINIC | Facility: CLINIC | Age: 75
End: 2020-12-02

## 2020-12-02 VITALS
SYSTOLIC BLOOD PRESSURE: 130 MMHG | HEART RATE: 63 BPM | BODY MASS INDEX: 26.51 KG/M2 | DIASTOLIC BLOOD PRESSURE: 70 MMHG | WEIGHT: 179 LBS | OXYGEN SATURATION: 99 % | HEIGHT: 69 IN

## 2020-12-02 DIAGNOSIS — I10 ESSENTIAL HYPERTENSION: Primary | ICD-10-CM

## 2020-12-02 PROCEDURE — 99213 OFFICE O/P EST LOW 20 MIN: CPT | Performed by: FAMILY MEDICINE

## 2020-12-02 NOTE — PROGRESS NOTES
"  Chief Complaint   Patient presents with   • Hypertension       Subjective     Patient here for follow-up of elevated blood pressure.    He is not exercising and is adherent to a low-salt diet.    Blood pressure is well controlled at home.   Cardiac symptoms: none.   Patient denies: chest pressure/discomfort, claudication, cough, dyspnea, exertional chest pressure/discomfort, fatigue, irregular heart beat, lower extremity edema, near-syncope, orthopnea, palpitations, paroxysmal nocturnal dyspnea, syncope, tachypnea and weight gain.   Cardiovascular risk factors: advanced age (older than 55 for men, 65 for women), dyslipidemia and hypertension.   Use of agents associated with hypertension: none.   History of target organ damage: none.  Patient is taking prescribed hypertension medications as prescribed without side effects.    The following portions of the patient's history were reviewed and updated as appropriate: allergies, current medications, past family history, past medical history, past social history, past surgical history and problem list.    Review of Systems  Pertinent items are noted in HPI.         Vitals:    12/02/20 0931   BP: 130/70   BP Location: Left arm   Patient Position: Sitting   Cuff Size: Adult   Pulse: 63   SpO2: 99%   Weight: 81.2 kg (179 lb)   Height: 175.3 cm (69\")     BP Readings from Last 3 Encounters:   12/02/20 130/70   06/08/20 130/70   11/20/19 138/80     Objective      Gen: alert, pleasant.  HEENT: PERRL, EOMI intact, lids ok, ear canals clear, TMs normal, throat clear, nostrils normal  Neck: no bruit, no enlarged thyroid  Lungs: CTA  Heart: RR no murmur  ABD: soft , + BS  Pulses: intact  Mood: stable     Assessment/Plan   Hypertension, normal blood pressure Evidence of target organ damage: none.    Diagnoses and all orders for this visit:    1. Essential hypertension (Primary)    overall doing well  Still seeing Dr Dexter for BESSIE and narcolepsy.    Medication: no change.  Follow " up: 6 months and as needed.    There are no Patient Instructions on file for this visit.  There are no discontinued medications.     No follow-ups on file.    Limit salt  Limit alcoholic drinks to 1 a day  Limit caffeine to 1-2 servings a day    Dr. Robby Henley MD  [place], [place]  [facility].

## 2021-03-15 ENCOUNTER — BULK ORDERING (OUTPATIENT)
Dept: CASE MANAGEMENT | Facility: OTHER | Age: 76
End: 2021-03-15

## 2021-03-15 DIAGNOSIS — Z23 IMMUNIZATION DUE: ICD-10-CM

## 2021-03-18 ENCOUNTER — TELEPHONE (OUTPATIENT)
Dept: FAMILY MEDICINE CLINIC | Facility: CLINIC | Age: 76
End: 2021-03-18

## 2021-03-18 NOTE — TELEPHONE ENCOUNTER
PATIENT STATED HE HAS A CD WITH A X-RAY ON IT . A CHIROPRACTOR PUT THE X-RAYS ON THIS CD. PATIENT WANTS TO KNOW IF OFFICE CAN DOWNLOAD IT OR SEE WHATS ON IT BECAUSE THE CHIROPRACTOR HE IS SEEING NOW DOESN'T HAVE THE EQUIPMENT TO READ THIS. CAN U PLEASE LET THE PATIENT KNOW IF THIS CAN BE DONE OR NOT.     CALLBACK NUMBER - [phone]

## 2021-04-12 ENCOUNTER — APPOINTMENT (OUTPATIENT)
Dept: GENERAL RADIOLOGY | Facility: HOSPITAL | Age: 76
End: 2021-04-12

## 2021-04-12 ENCOUNTER — TELEPHONE (OUTPATIENT)
Dept: FAMILY MEDICINE CLINIC | Facility: CLINIC | Age: 76
End: 2021-04-12

## 2021-04-12 PROCEDURE — 72110 X-RAY EXAM L-2 SPINE 4/>VWS: CPT | Performed by: GENERAL PRACTICE

## 2021-04-12 PROCEDURE — 73502 X-RAY EXAM HIP UNI 2-3 VIEWS: CPT | Performed by: GENERAL PRACTICE

## 2021-04-12 NOTE — TELEPHONE ENCOUNTER
Caller: Jim Frances    Relationship: Self    Best call back number: [phone]    What orders are you requesting (i.e. lab or imaging): X-RAY     In what timeframe would the patient need to come in: ASAP     Where will you receive your lab/imaging services: [facility]     Additional notes: PATIENT HAD FALL MIDDLE OF NIGHT 4-11-21. PATIUENT HAS LOWER BACK PAIN. PATIENT IS ASKING IF DR WALLACE WOULD SEND ORDERS FOR X-RAYS TO: [facility] ON [facility].        PLEASE CALL PATIENT AND ADVISE

## 2021-06-01 DIAGNOSIS — Z85.46 PERSONAL HISTORY OF PROSTATE CANCER: ICD-10-CM

## 2021-06-01 DIAGNOSIS — Z12.5 SCREENING FOR PROSTATE CANCER: ICD-10-CM

## 2021-06-01 DIAGNOSIS — I10 ESSENTIAL HYPERTENSION: ICD-10-CM

## 2021-06-01 DIAGNOSIS — M1A.0710 CHRONIC IDIOPATHIC GOUT INVOLVING TOE OF RIGHT FOOT WITHOUT TOPHUS: Primary | ICD-10-CM

## 2021-06-11 ENCOUNTER — OFFICE VISIT (OUTPATIENT)
Dept: FAMILY MEDICINE CLINIC | Facility: CLINIC | Age: 76
End: 2021-06-11

## 2021-06-11 VITALS
OXYGEN SATURATION: 98 % | WEIGHT: 178.2 LBS | SYSTOLIC BLOOD PRESSURE: 120 MMHG | DIASTOLIC BLOOD PRESSURE: 78 MMHG | BODY MASS INDEX: 26.39 KG/M2 | HEIGHT: 69 IN | HEART RATE: 66 BPM | TEMPERATURE: 97.1 F

## 2021-06-11 DIAGNOSIS — Z00.00 MEDICARE ANNUAL WELLNESS VISIT, SUBSEQUENT: Primary | ICD-10-CM

## 2021-06-11 DIAGNOSIS — M51.36 DDD (DEGENERATIVE DISC DISEASE), LUMBAR: ICD-10-CM

## 2021-06-11 PROBLEM — M51.369 DDD (DEGENERATIVE DISC DISEASE), LUMBAR: Status: ACTIVE | Noted: 2021-06-11

## 2021-06-11 PROBLEM — Z12.5 SCREENING FOR PROSTATE CANCER: Status: RESOLVED | Noted: 2020-05-27 | Resolved: 2021-06-11

## 2021-06-11 PROCEDURE — G0439 PPPS, SUBSEQ VISIT: HCPCS | Performed by: FAMILY MEDICINE

## 2021-06-11 RX ORDER — INDOMETHACIN 50 MG/1
50 CAPSULE ORAL 3 TIMES DAILY PRN
Qty: 90 CAPSULE | Refills: 0 | Status: SHIPPED | OUTPATIENT
Start: 2021-06-11 | End: 2021-08-18

## 2021-06-11 RX ORDER — ZOSTER VACCINE RECOMBINANT, ADJUVANTED 50 MCG/0.5
50 KIT INTRAMUSCULAR
Qty: 1 EACH | Refills: 1 | Status: SHIPPED | OUTPATIENT
Start: 2021-06-11 | End: 2021-12-09

## 2021-06-11 NOTE — PATIENT INSTRUCTIONS
Results for orders placed or performed in visit on 06/02/21   Lipid Panel    Specimen: Blood   Result Value Ref Range    Total Cholesterol 153 0 - 200 mg/dL    Triglycerides 76 0 - 150 mg/dL    HDL Cholesterol 53 40 - 60 mg/dL    VLDL Cholesterol Lorenzo 15 5 - 40 mg/dL    LDL Chol Calc (NIH) 85 0 - 100 mg/dL   Comprehensive Metabolic Panel    Specimen: Blood   Result Value Ref Range    Glucose 99 65 - 99 mg/dL    BUN 19 8 - 23 mg/dL    Creatinine 0.97 0.76 - 1.27 mg/dL    eGFR Non African Am 75 >60 mL/min/1.73    eGFR African Am 91 >60 mL/min/1.73    BUN/Creatinine Ratio 19.6 7.0 - 25.0    Sodium 141 136 - 145 mmol/L    Potassium 5.5 (H) 3.5 - 5.2 mmol/L    Chloride 103 98 - 107 mmol/L    Total CO2 33.5 (H) 22.0 - 29.0 mmol/L    Calcium 9.8 8.6 - 10.5 mg/dL    Total Protein 6.9 6.0 - 8.5 g/dL    Albumin 4.50 3.50 - 5.20 g/dL    Globulin 2.4 gm/dL    A/G Ratio 1.9 g/dL    Total Bilirubin 0.4 0.0 - 1.2 mg/dL    Alkaline Phosphatase 74 39 - 117 U/L    AST (SGOT) 38 1 - 40 U/L    ALT (SGPT) 38 1 - 41 U/L   CBC (No Diff)    Specimen: Blood   Result Value Ref Range    WBC 5.95 3.40 - 10.80 10*3/mm3    RBC 4.47 4.14 - 5.80 10*6/mm3    Hemoglobin 13.9 13.0 - 17.7 g/dL    Hematocrit 42.5 37.5 - 51.0 %    MCV 95.1 79.0 - 97.0 fL    MCH 31.1 26.6 - 33.0 pg    MCHC 32.7 31.5 - 35.7 g/dL    RDW 13.3 12.3 - 15.4 %    Platelets 148 140 - 450 10*3/mm3   TSH    Specimen: Blood   Result Value Ref Range    TSH 1.760 0.270 - 4.200 uIU/mL   Urinalysis With Microscopic If Indicated (No Culture) - Urine, Clean Catch    Specimen: Urine, Clean Catch   Result Value Ref Range    Specific Gravity, UA 1.019 1.005 - 1.030    pH, UA 7.0 5.0 - 8.0    Color, UA Yellow     Appearance, UA Clear Clear    Leukocytes, UA Negative Negative    Protein Negative Negative    Glucose, UA Negative Negative    Ketones Negative Negative    Blood, UA Negative Negative    Bilirubin, UA Negative Negative    Urobilinogen, UA Comment     Nitrite, UA Negative Negative    Uric acid    Specimen: Blood   Result Value Ref Range    Uric Acid 5.8 3.4 - 7.0 mg/dL   PSA DIAGNOSTIC    Specimen: Blood   Result Value Ref Range    PSA 1.590 0.000 - 4.000 ng/mL

## 2021-06-11 NOTE — PROGRESS NOTES
The ABCs of the Annual Wellness Visit  Subsequent Medicare Wellness Visit    Chief Complaint   Patient presents with   • Medicare Wellness-subsequent   • Back Pain     went to  in april, has seen chiropractors and accupunctionist - lower back into legs       Subjective   History of Present Illness:  Jim Frances is a 75 y.o. male who presents for a Subsequent Medicare Wellness Visit.    HEALTH RISK ASSESSMENT    Recent Hospitalizations:  No hospitalization(s) within the last year.    Current Medical Providers:  Patient Care Team:  Robby Henley MD as PCP - General (Family Medicine)  Matheus Dexter MD as Consulting Physician (Pulmonary Disease)  Igor Miller Jr., MD as Consulting Physician (Urology)    Smoking Status:  Social History     Tobacco Use   Smoking Status Light Tobacco Smoker   • Types: Cigars   Smokeless Tobacco Never Used   Tobacco Comment    1 per mo       Alcohol Consumption:  Social History     Substance and Sexual Activity   Alcohol Use Yes   • Alcohol/week: 14.0 standard drinks   • Types: 14 Shots of liquor per week    Comment: 2 drinks a day        Depression Screen:   PHQ-2/PHQ-9 Depression Screening 6/11/2021   Little interest or pleasure in doing things 0   Feeling down, depressed, or hopeless 0   Trouble falling or staying asleep, or sleeping too much 0   Feeling tired or having little energy 0   Poor appetite or overeating 0   Feeling bad about yourself - or that you are a failure or have let yourself or your family down 0   Trouble concentrating on things, such as reading the newspaper or watching television 0   Moving or speaking so slowly that other people could have noticed. Or the opposite - being so fidgety or restless that you have been moving around a lot more than usual 0   Thoughts that you would be better off dead, or of hurting yourself in some way 0   Total Score 0       Fall Risk Screen:  STEADI Fall Risk Assessment has not been completed.    Health Habits  and Functional and Cognitive Screening:  Functional & Cognitive Status 6/11/2021   Do you have difficulty preparing food and eating? No   Do you have difficulty bathing yourself, getting dressed or grooming yourself? No   Do you have difficulty using the toilet? No   Do you have difficulty moving around from place to place? No   Do you have trouble with steps or getting out of a bed or a chair? No   Current Diet Well Balanced Diet   Dental Exam Up to date   Eye Exam Up to date   Exercise (times per week) 7 times per week   Current Exercises Include Walking   Current Exercise Activities Include -   Do you need help using the phone?  No   Are you deaf or do you have serious difficulty hearing?  No   Do you need help with transportation? No   Do you need help shopping? No   Do you need help preparing meals?  No   Do you need help with housework?  No   Do you need help with laundry? No   Do you need help taking your medications? No   Do you need help managing money? No   Do you ever drive or ride in a car without wearing a seat belt? No   Have you felt unusual stress, anger or loneliness in the last month? No   Who do you live with? Spouse   If you need help, do you have trouble finding someone available to you? No   Have you been bothered in the last four weeks by sexual problems? No   Do you have difficulty concentrating, remembering or making decisions? Yes         Does the patient have evidence of cognitive impairment? No    Asprin use counseling:Does not need ASA (and currently is not on it)    Age-appropriate Screening Schedule:  Refer to the list below for future screening recommendations based on patient's age, sex and/or medical conditions. Orders for these recommended tests are listed in the plan section. The patient has been provided with a written plan.    Health Maintenance   Topic Date Due   • ZOSTER VACCINE (1 of 2) Never done   • INFLUENZA VACCINE  08/01/2021   • LIPID PANEL  06/02/2022   • TDAP/TD  VACCINES (3 - Td or Tdap) 01/11/2027          The following portions of the patient's history were reviewed and updated as appropriate: allergies, current medications, past family history, past medical history, past social history, past surgical history and problem list.    Outpatient Medications Prior to Visit   Medication Sig Dispense Refill   • allopurinol (ZYLOPRIM) 300 MG tablet Take 1 tablet by mouth Daily. 90 tablet 3   • amphetamine-dextroamphetamine (ADDERALL) 10 MG tablet Take 10 mg by mouth.     • fluticasone (CUTIVATE) 0.005 % ointment      • lisinopril (PRINIVIL,ZESTRIL) 10 MG tablet Take 1 tablet by mouth Daily. Indications: High Blood Pressure Disorder 90 tablet 3   • melatonin 5 MG tablet tablet Take 5 mg by mouth.     • Multiple Vitamin (MULTI-VITAMIN DAILY PO) Take  by mouth.     • Omega-3 Fatty Acids (FISH OIL) 1000 MG capsule capsule Take  by mouth Daily With Breakfast.     • Pitolisant HCl (Wakix) 17.8 MG tablet      • sertraline (ZOLOFT) 100 MG tablet Take 1.5 tablets by mouth Daily for 90 days. Indications: Major Depressive Disorder 135 tablet 3   • Sodium Oxybate (XYREM) 500 MG/ML solution 4.5 gm diluted in 60 ml of water at 11pm and 2am. 3 bottle 1   • tamsulosin (FLOMAX) 0.4 MG capsule 24 hr capsule Take 1 capsule by mouth Daily. 90 capsule 3   • indomethacin (INDOCIN) 50 MG capsule Take 1 capsule by mouth 3 (Three) Times a Day As Needed for Mild Pain . 30 capsule 0     No facility-administered medications prior to visit.       Patient Active Problem List   Diagnosis   • Personal history of prostate cancer   • Narcolepsy without cataplexy   • Essential hypertension   • Chronic idiopathic gout involving toe of right foot without tophus   • Recurrent major depressive disorder, in partial remission (CMS/HCC)   • Mixed hyperlipidemia   • BESSIE (obstructive sleep apnea)   • Medicare annual wellness visit, subsequent   • Narcolepsy with cataplexy   • REM behavioral disorder   • DDD (degenerative  "disc disease), lumbar       Advanced Care Planning:  ACP discussion was held with the patient during this visit. Patient has an advance directive (not in EMR), copy requested.    Review of Systems    Compared to one year ago, the patient feels his physical health is worse.  Compared to one year ago, the patient feels his mental health is worse.    Reviewed chart for potential of high risk medication in the elderly: yes  Reviewed chart for potential of harmful drug interactions in the elderly:yes    Objective         Vitals:    06/11/21 1025   BP: 120/78   BP Location: Left arm   Patient Position: Sitting   Cuff Size: Adult   Pulse: 66   Temp: 97.1 °F (36.2 °C)   TempSrc: Temporal   SpO2: 98%   Weight: 80.8 kg (178 lb 3.2 oz)   Height: 175.3 cm (69\")       Body mass index is 26.32 kg/m².  Discussed the patient's BMI with him. The BMI is in the acceptable range.    Physical Exam  Vitals reviewed.   Cardiovascular:      Rate and Rhythm: Normal rate.   Neurological:      Mental Status: He is alert.   Psychiatric:         Mood and Affect: Mood normal.         Lab Results   Component Value Date    GLU 99 06/02/2021    CHLPL 153 06/02/2021    TRIG 76 06/02/2021    HDL 53 06/02/2021    LDL 85 06/02/2021    VLDL 15 06/02/2021        Assessment/Plan   Medicare Risks and Personalized Health Plan  CMS Preventative Services Quick Reference  Cardiovascular risk  Chronic Pain   Colon Cancer Screening  Dementia/Memory   Depression/Dysphoria  Diabetic Lab Screening   Immunizations Discussed/Encouraged (specific immunizations; Shingrix )  Prostate Cancer Screening     The above risks/problems have been discussed with the patient.  Pertinent information has been shared with the patient in the After Visit Summary.  Follow up plans and orders are seen below in the Assessment/Plan Section.    Diagnoses and all orders for this visit:    1. Medicare annual wellness visit, subsequent (Primary)    2. DDD (degenerative disc disease), " lumbar  -     indomethacin (INDOCIN) 50 MG capsule; Take 1 capsule by mouth 3 (Three) Times a Day As Needed for Mild Pain .  Dispense: 90 capsule; Refill: 0    Other orders  -     Zoster Vac Recomb Adjuvanted (Shingrix) 50 MCG/0.5ML reconstituted suspension; Inject 0.5 mL into the appropriate muscle as directed by prescriber Every 6 (Six) Months for 2 doses.  Dispense: 1 each; Refill: 1    grief over the loss of his brother dilip due to leukemia last year.    Labs ok    Follow Up:  No follow-ups on file.     An After Visit Summary and PPPS were given to the patient.

## 2021-06-14 ENCOUNTER — TELEPHONE (OUTPATIENT)
Dept: FAMILY MEDICINE CLINIC | Facility: CLINIC | Age: 76
End: 2021-06-14

## 2021-06-14 DIAGNOSIS — M51.36 DDD (DEGENERATIVE DISC DISEASE), LUMBAR: Primary | ICD-10-CM

## 2021-06-14 NOTE — TELEPHONE ENCOUNTER
Caller: Jim Frances    Relationship: Self    Best call back number: [phone] (H)    What orders are you requesting (i.e. lab or imaging):ORDER FOR ACUPUNCTURE     In what timeframe would the patient need to come in: ASAP    Where will you receive your lab/imaging services: [facility] PHONE # [phone]    Additional notes: PATIENT CALLED TO REQUEST AN ORDER BE SENT OVER TO [facility] THAT STATES THAT THIS IS A MEDICAL NECESSITY.     PLEASE CONTACT PATIENT ADVISE.     THANKS

## 2021-06-17 NOTE — TELEPHONE ENCOUNTER
Caller: Jim Frances    Relationship to patient: Self    Best call back number: [phone]    Patient is needing: PATIENT IS CALLING BACK ONCE MORE IN REGARDS TO THE ACUPUNCTURE ORDER

## 2021-06-18 NOTE — TELEPHONE ENCOUNTER
Caller: Jim Frances    Relationship to patient: Self    Best call back number: [phone]    Patient is needing: PATIENT CALLED IN AND WANTED TO KNOW THE STATUS OF HIS LETTER FOR ACUPUNCTURE. PLEASE CALL PATIENT AND ADVISE.      Okay referral made for acupuncture.  I placed a small letter within the referral note.  Our referral staff is not have to make referral acupuncture, as he already sees his current acupuncturist

## 2021-06-21 NOTE — TELEPHONE ENCOUNTER
Spoke with patient, he will call back with fax number/ contact info for referral to be sent over.

## 2021-06-24 ENCOUNTER — TELEPHONE (OUTPATIENT)
Dept: FAMILY MEDICINE CLINIC | Facility: CLINIC | Age: 76
End: 2021-06-24

## 2021-06-24 RX ORDER — LIDOCAINE 50 MG/G
1 PATCH TOPICAL EVERY 24 HOURS
Qty: 90 PATCH | Refills: 0 | Status: SHIPPED | OUTPATIENT
Start: 2021-06-24 | End: 2022-01-05

## 2021-06-24 NOTE — TELEPHONE ENCOUNTER
PATIENT WOULD LIKE A LIDOCAIN PAIN PATCH SCRIPT SENT TO [facility].    [facility] - [place], [address] [phone]  - [phone]      [phone]

## 2021-06-24 NOTE — TELEPHONE ENCOUNTER
Please advise- do not see any past lidocaine patch only a gel.     Okay , LidoDerm patches sent to mail order  #[phone]

## 2021-07-09 DIAGNOSIS — I10 ESSENTIAL HYPERTENSION: ICD-10-CM

## 2021-07-09 RX ORDER — LISINOPRIL 10 MG/1
TABLET ORAL
Qty: 90 TABLET | Refills: 1 | Status: SHIPPED | OUTPATIENT
Start: 2021-07-09 | End: 2021-12-10 | Stop reason: SDUPTHER

## 2021-07-16 ENCOUNTER — TELEPHONE (OUTPATIENT)
Dept: FAMILY MEDICINE CLINIC | Facility: CLINIC | Age: 76
End: 2021-07-16

## 2021-07-16 DIAGNOSIS — M51.36 DDD (DEGENERATIVE DISC DISEASE), LUMBAR: Primary | ICD-10-CM

## 2021-07-16 NOTE — TELEPHONE ENCOUNTER
Patient states he is still having back issues, he has been to the chiropractor and acupuncturist. He asked if you think he needs to be referred to a specialist or come back and see you? Please advise     Probably time to get an MRI of your lumbar spine.  I will order this.  Then see me office a day or 2 after the scan is done and we will go over the results.    Robby Henley MD

## 2021-07-20 NOTE — TELEPHONE ENCOUNTER
PATIENT IS CALLING ABOUT STATUS ON MRI HE STILL HAS NOT HEARD ANYTHING ABOUT IT AT THIS TIME.      PLEASE ADVISE    CALLBACK NUMBER IS  [phone]

## 2021-08-07 ENCOUNTER — HOSPITAL ENCOUNTER (OUTPATIENT)
Dept: MRI IMAGING | Facility: HOSPITAL | Age: 76
Discharge: HOME | End: 2021-08-07
Admitting: FAMILY MEDICINE

## 2021-08-07 DIAGNOSIS — M51.36 DDD (DEGENERATIVE DISC DISEASE), LUMBAR: ICD-10-CM

## 2021-08-07 PROCEDURE — 72148 MRI LUMBAR SPINE W/O DYE: CPT

## 2021-08-09 ENCOUNTER — TELEPHONE (OUTPATIENT)
Dept: FAMILY MEDICINE CLINIC | Facility: CLINIC | Age: 76
End: 2021-08-09

## 2021-08-09 NOTE — TELEPHONE ENCOUNTER
Caller: Jim Frances    Relationship: Self    Best call back number: [phone]     Caller requesting test results: PATIENT    What test was performed: MRI    When was the test performed: Saturday MORNING    Where was the test performed: [facility]

## 2021-08-10 ENCOUNTER — TELEPHONE (OUTPATIENT)
Dept: FAMILY MEDICINE CLINIC | Facility: CLINIC | Age: 76
End: 2021-08-10

## 2021-08-13 ENCOUNTER — OFFICE VISIT (OUTPATIENT)
Dept: FAMILY MEDICINE CLINIC | Facility: CLINIC | Age: 76
End: 2021-08-13

## 2021-08-13 VITALS
DIASTOLIC BLOOD PRESSURE: 80 MMHG | SYSTOLIC BLOOD PRESSURE: 142 MMHG | WEIGHT: 177 LBS | HEIGHT: 69 IN | BODY MASS INDEX: 26.22 KG/M2 | OXYGEN SATURATION: 99 % | HEART RATE: 70 BPM | TEMPERATURE: 98 F

## 2021-08-13 DIAGNOSIS — M51.36 DDD (DEGENERATIVE DISC DISEASE), LUMBAR: Primary | ICD-10-CM

## 2021-08-13 PROCEDURE — 99213 OFFICE O/P EST LOW 20 MIN: CPT | Performed by: FAMILY MEDICINE

## 2021-08-13 RX ORDER — CYCLOSPORINE 0.5 MG/ML
EMULSION OPHTHALMIC
COMMUNITY
Start: 2021-07-27

## 2021-08-13 RX ORDER — HYDROCODONE BITARTRATE AND ACETAMINOPHEN 5; 325 MG/1; MG/1
1 TABLET ORAL EVERY 4 HOURS PRN
Qty: 18 TABLET | Refills: 0 | Status: SHIPPED | OUTPATIENT
Start: 2021-08-13 | End: 2022-03-28

## 2021-08-13 NOTE — PROGRESS NOTES
"  Subjective   Jim Frances is a 75 y.o. male who is here for   Chief Complaint   Patient presents with   • Back Pain     MRI follow up    .     History of Present Illness   Mr. Frances comes back in for lumbar pain.  Is been going on for several months now.  He has tried chiropractic care and acupuncture and over-the-counter medication with no relief.  Reviewed his lumbar MRI today he has multiple bulging disks.  He continues to have low back pain which radiates into both left and right buttocks and radiates into his thighs.  He feels loss of strength in his upper legs.  He has suffered several falls in the past couple years nothing as of recent.        The following portions of the patient's history were reviewed and updated as appropriate: allergies, current medications, past family history, past medical history, past social history, past surgical history and problem list.    Review of Systems    Objective   Vitals:    08/13/21 1301   BP: 142/80   BP Location: Left arm   Patient Position: Sitting   Cuff Size: Adult   Pulse: 70   Temp: 98 °F (36.7 °C)   TempSrc: Temporal   SpO2: 99%   Weight: 80.3 kg (177 lb)   Height: 175.3 cm (69\")      Physical Exam  Musculoskeletal:        Legs:        MRI Lumbar Spine Without Contrast (08/07/2021 09:21)      Assessment/Plan   Diagnoses and all orders for this visit:    1. DDD (degenerative disc disease), lumbar (Primary)  -     Ambulatory Referral to Pain Management  -     Ambulatory Referral to Physical Therapy Evaluate and treat  -     HYDROcodone-acetaminophen (Norco) 5-325 MG per tablet; Take 1 tablet by mouth Every 4 (Four) Hours As Needed for Severe Pain .  Dispense: 18 tablet; Refill: 0    From the radiology report it appears most of his disease and is in the L4-5 area.  We will set him up for L4-5 steroid lumbar injection with Dr. Warner in [place].  He would like to try physical therapy.  We will make referral for that.  And Norco for the pain.    There are no " Patient Instructions on file for this visit.    There are no discontinued medications.     No follow-ups on file.    Dr. Robby Henley  [place], [place]

## 2021-08-18 DIAGNOSIS — M51.36 DDD (DEGENERATIVE DISC DISEASE), LUMBAR: ICD-10-CM

## 2021-08-18 RX ORDER — INDOMETHACIN 50 MG/1
CAPSULE ORAL
Qty: 90 CAPSULE | Refills: 3 | Status: SHIPPED | OUTPATIENT
Start: 2021-08-18

## 2021-08-25 ENCOUNTER — TREATMENT (OUTPATIENT)
Dept: PHYSICAL THERAPY | Facility: CLINIC | Age: 76
End: 2021-08-25

## 2021-08-25 DIAGNOSIS — R26.2 DIFFICULTY IN WALKING: ICD-10-CM

## 2021-08-25 DIAGNOSIS — M51.36 DDD (DEGENERATIVE DISC DISEASE), LUMBAR: Primary | ICD-10-CM

## 2021-08-25 DIAGNOSIS — M54.16 LUMBAR BACK PAIN WITH RADICULOPATHY AFFECTING LOWER EXTREMITY: ICD-10-CM

## 2021-08-25 PROCEDURE — 97110 THERAPEUTIC EXERCISES: CPT | Performed by: PHYSICAL THERAPIST

## 2021-08-25 PROCEDURE — 97012 MECHANICAL TRACTION THERAPY: CPT | Performed by: PHYSICAL THERAPIST

## 2021-08-25 PROCEDURE — 97161 PT EVAL LOW COMPLEX 20 MIN: CPT | Performed by: PHYSICAL THERAPIST

## 2021-08-25 NOTE — PROGRESS NOTES
Physical Therapy Initial Evaluation and Plan of Care    Patient: Jim Frances   : 1945  Diagnosis/ICD-10 Code:  DDD (degenerative disc disease), lumbar [M51.36]  Referring practitioner: Robby Henley MD    Subjective Evaluation    History of Present Illness  Onset date: 4 month ago.  Mechanism of injury: Pt is a 76 y/o male who reports to the clinic with c/o LBP and B leg pain.  Pt has been going to a Chiropractor and Acupuncture, but can't tell if they have helped.  Pt went to [facility]-x-ray-mild narrowing and DDD-pain cvyznnmlw-UEP-mlukef 4 bulging disc-scheduled to have epidurals-referred to PT.  Pt states the pain is only with standing and walking.  Pt states when he climbs on a step stool/ladder his legs feel weak.  Pt with a Hx of a fall 2 years ago when he fell down the steps, but never had back pain other than soreness. Had x-rays and testing performed after fall no Fxs or concussion.          Subjective comment: Pt states he has an inversion table, but he has not used it yet.    Patient Occupation: retired  Quality of life: good    Pain  Current pain rating: 3  At worst pain ratin  Location: lower back and down both legs   Quality: sharp and cramping (back of legs in gastroc and hamstrings )  Relieving factors: medications, support, rest, change in position, heat and ice (sitting in recliner)  Aggravating factors: ambulation, standing, stairs, lifting and squatting    Hand dominance: right    Diagnostic Tests  X-ray: abnormal (disc space narrowing, spurring )  MRI studies: abnormal (mod disc bulging, L5-S1 mild HNP )    Treatments  Previous treatment: chiropractic (acupuncture )  Current treatment: chiropractic  Patient Goals  Patient goals for therapy: decreased pain, increased motion, increased strength and return to sport/leisure activities             Objective          Palpation     Additional Palpation Details  Pt tender to palpation over B SI, gluteals and piriformis          Tenderness     Left Hip   Tenderness in the PSIS.     Right Hip   Tenderness in the PSIS.     Neurological Testing     Sensation     Lumbar   Left   Intact: light touch    Right   Intact: light touch    Reflexes   Left   Patellar (L4): normal (2+)  Achilles (S1): trace (1+)    Right   Patellar (L4): normal (2+)  Achilles (S1): trace (1+)    Active Range of Motion     Lumbar   Flexion: 100 degrees   Extension: 25 degrees with pain  Left lateral flexion: 75 degrees   Right lateral flexion: 75 degrees   Left rotation: WFL  Right rotation: WFL    Additional Active Range of Motion Details  Pt c/o pain in B legs with extension     Strength/Myotome Testing     Left Hip   Planes of Motion   Flexion: 4+    Right Hip   Planes of Motion   Flexion: 4+    Left Knee   Flexion: 5  Extension: 5    Right Knee   Flexion: 4  Extension: 5    Left Ankle/Foot   Dorsiflexion: 5  Plantar flexion: 5  Eversion: 5  Great toe extension: 5    Right Ankle/Foot   Dorsiflexion: 5  Plantar flexion: 5  Eversion: 5  Great toe extension: 5    Additional Strength Details  Upper abdominal strength 3/5   Trunk Ext 3/5     Tests       Thoracic   Negative slump.     Lumbar     Left   Negative crossed SLR, femoral stretch and passive SLR.     Right   Negative crossed SLR, femoral stretch and passive SLR.     Left Hip   Positive Ely's and piriformis.   Negative ROLO and long sit.   90/90 SLR: Negative.   SLR: Negative.     Right Hip   Positive ROLO and piriformis.   Negative Ely's and long sit.   90/90 SLR: Negative.  SLR: Negative.     Additional Tests Details  Mild tightness in B piriformis   Pain in right hip during Rolo, but denies LBP.            Assessment & Plan     Assessment  Impairments: abnormal or restricted ROM, activity intolerance, impaired physical strength, lacks appropriate home exercise program, pain with function and weight-bearing intolerance  Assessment details: Pt is a 74 y/o WM who reports to the clinic with LBP, B LE pain,  LE weakness when climbing step stools/ladder, tenderness in B buttocks/SI, tightness in piriformis and rectus femoris, decreased ROM with pain in B legs during lumbar ext, right hamstring strength, and decreased functional mobility due to increased pain with standing and ambulation.    Prognosis: good  Functional Limitations: carrying objects, lifting, walking, uncomfortable because of pain and standing  Goals  Plan Goals: STGs: 2-4 weeks  1.  Decrease LBP and B LE pain to a 6/10 with standing and ambulation   2.  Pt is compliant with his HEP   3.  Decrease B piriformis and SI tenderness to minimal to none with palpation   4.  Increase B piriformis flexibility to minimal       LTGs: 4-8 weeks  1.  Decrease LBP and B LE pain to a 3/10 with standing and ambulation   2.  Increase B piriformis flexibility to WNL   3.  Pt is independent with HEP  4.  Increase right hamstring strength to 5/5       Plan  Therapy options: will be seen for skilled physical therapy services  Planned modality interventions: cryotherapy, electrical stimulation/Citizen of [place] stimulation, ultrasound, traction, thermotherapy (hydrocollator packs) and dry needling  Planned therapy interventions: abdominal trunk stabilization, flexibility, functional ROM exercises, home exercise program, stretching, strengthening, spinal/joint mobilization, manual therapy, soft tissue mobilization and therapeutic activities  Duration in visits: 16  Treatment plan discussed with: patient        Manual Therapy:         mins  39447;  Therapeutic Exercise:   20      mins  49268;     Neuromuscular Jb:        mins  39747;    Therapeutic Activity:          mins  19652;     Gait Training:           mins  51590;     Ultrasound:          mins  28916;    Electrical Stimulation:         mins  92994 ( );  Dry Needling          mins self-pay  Lumbar Traction           10 mins 85399     Timed Treatment:  20    mins   Total Treatment:     60  mins    PT SIGNATURE: Edilma Bentley  Marleny, PT   DATE TREATMENT INITIATED: 8/27/2021    Medicare Initial Certification  Certification Period: 11/25/2021  I certify that the therapy services are furnished while this patient is under my care.  The services outlined above are required by this patient, and will be reviewed every 90 days.     PHYSICIAN: Robby Henley MD      DATE:     Please sign and return via fax to [phone].. Thank you, [facility].

## 2021-08-27 ENCOUNTER — TREATMENT (OUTPATIENT)
Dept: PHYSICAL THERAPY | Facility: CLINIC | Age: 76
End: 2021-08-27

## 2021-08-27 DIAGNOSIS — M54.50 ACUTE BILATERAL LOW BACK PAIN, UNSPECIFIED WHETHER SCIATICA PRESENT: Primary | ICD-10-CM

## 2021-08-27 PROCEDURE — 97110 THERAPEUTIC EXERCISES: CPT | Performed by: PHYSICAL THERAPIST

## 2021-08-27 PROCEDURE — 97012 MECHANICAL TRACTION THERAPY: CPT | Performed by: PHYSICAL THERAPIST

## 2021-08-27 PROCEDURE — 97112 NEUROMUSCULAR REEDUCATION: CPT | Performed by: PHYSICAL THERAPIST

## 2021-08-27 NOTE — PATIENT INSTRUCTIONS
Access Code: [email or web address]  URL: [email or web address]  Date: 08/27/2021  Prepared by: Debra Silva    Exercises  Supine Double Knee to Chest - 1 x daily - 7 x weekly - 5 sets - 10 hold  Supine Bridge - 1 x daily - 7 x weekly - 10 reps  Supine March - 1 x daily - 7 x weekly - 10 reps  Supine Piriformis Stretch with Foot on Ground - 1 x daily - 7 x weekly - 5 sets - 10 hold  Supine Hamstring Stretch with Strap - 1 x daily - 7 x weekly - 3 sets - 10 hold  Supine Lower Trunk Rotation - 1 x daily - 7 x weekly - 3 sets - 10 reps  Sidelying Hip Abduction - 1 x daily - 7 x weekly - 10 reps  Prone Press Up - 1 x daily - 7 x weekly - 10 reps  Cat-Camel - 1 x daily - 7 x weekly - 3 sets - 10 reps

## 2021-08-27 NOTE — PROGRESS NOTES
Physical Therapy Daily Progress Note      Patient: Jim Frances   : 1945  Referring practitioner: Robby Henley MD  Date of Initial Visit: Type: THERAPY  Noted: 2021  Today's Date: 2021  Patient seen for 2 sessions         Jim Frances reports: 4-5/10 soreness/pain; hard to differentiate at this time. Pt reports his legs also feel weak. Pt reports he felt pretty good after his evaluation last session.       Objective   See Exercise, Manual, and Modality Logs for complete treatment.       Assessment/Plan  Pt reports gluteal muscle soreness with bridges, no increase in reps. Pt reports good stretch with B piriformis stretch. Added HS stretch, LTR, cat/camel this date; pt denies pain with new exercises. Education on PPT with  for proper core contraction. Pt with good flexibility with stretches; likely strength/stability issue of core and lumbar spine. Pt requires max VC/TC for proper technique with cat/camel; noted decreased spinal extension ROM thus added prone press ups. Increased pull of traction and time this date with no reports of increase pain. Updated HEP using [employer] and issued handout.       Progress per Plan of Care and Progress strengthening /stabilization /functional activity       Timed:  Manual Therapy:    -     mins  09388;  Therapeutic Exercise:    24     mins  04084;     Neuromuscular Jb:   10    mins  88169;    Therapeutic Activity:     -     mins  44876;     Gait Training:      -     mins  52325;     Ultrasound:     -     mins  53965;    Electrical Stimulation:    -     mins  14771 ( );    Untimed:  Electrical Stimulation:    -     mins  54747 ( );  Mechanical Traction:    12     mins  71182;     Timed Treatment:   34   mins   Total Treatment:     50   mins  Debra Hedrick PT  Physical Therapist

## 2021-08-30 ENCOUNTER — TREATMENT (OUTPATIENT)
Dept: PHYSICAL THERAPY | Facility: CLINIC | Age: 76
End: 2021-08-30

## 2021-08-30 DIAGNOSIS — M54.16 LUMBAR BACK PAIN WITH RADICULOPATHY AFFECTING LOWER EXTREMITY: ICD-10-CM

## 2021-08-30 DIAGNOSIS — M54.50 ACUTE BILATERAL LOW BACK PAIN, UNSPECIFIED WHETHER SCIATICA PRESENT: Primary | ICD-10-CM

## 2021-08-30 DIAGNOSIS — R26.2 DIFFICULTY IN WALKING: ICD-10-CM

## 2021-08-30 DIAGNOSIS — M51.36 DDD (DEGENERATIVE DISC DISEASE), LUMBAR: ICD-10-CM

## 2021-08-30 PROCEDURE — 97110 THERAPEUTIC EXERCISES: CPT | Performed by: PHYSICAL THERAPIST

## 2021-08-30 PROCEDURE — 97012 MECHANICAL TRACTION THERAPY: CPT | Performed by: PHYSICAL THERAPIST

## 2021-08-30 NOTE — PROGRESS NOTES
Physical Therapy Daily Progress Note    Visit # : 3  Jim Frances reports: yesterday was not a good day.  Pt had some increased pain in the lower back and posterior thighs.  Pt describes his pain as sharp with standing and walking.      Subjective     Objective   See Exercise, Manual, and Modality Logs for complete treatment.       Assessment & Plan     Assessment  Assessment details: Pt is doing fairly well with therapy, but is having some soreness after mechanical ILT. Increased time on the ILT to 12 minutes today to increase pt's pain relief between treatments, but did not increase weight due to pt's soreness.  Pt was able to perform all exercises and stretches without difficulty or pain.  Pt reported less pain post treatment and discussed with pt about performing an AH during sit to stand transitions to decrease pt's episodes of radicular leg pain.  Will continue to see pt 2x for stretching, stabilization exercises, and modalities PRN for pain.          Progress per Plan of Care           Manual Therapy:         mins  79753;  Therapeutic Exercise:   30      mins  77688;     Neuromuscular Jb:        mins  43096;    Therapeutic Activity:          mins  71085;     Gait Training:           mins  19186;     Ultrasound:          mins  79364;    Electrical Stimulation:         mins  59696 ( );  Dry Needling          mins self-pay  Lumbar Traction            12 mins 69731     Timed Treatment:  30    mins   Total Treatment:     50   mins    Edilma Farmer, PT  Physical Therapist

## 2021-08-31 ENCOUNTER — ANESTHESIA EVENT (OUTPATIENT)
Dept: PAIN MEDICINE | Facility: HOSPITAL | Age: 76
End: 2021-08-31

## 2021-08-31 ENCOUNTER — ANESTHESIA (OUTPATIENT)
Dept: PAIN MEDICINE | Facility: HOSPITAL | Age: 76
End: 2021-08-31

## 2021-08-31 ENCOUNTER — HOSPITAL ENCOUNTER (OUTPATIENT)
Dept: GENERAL RADIOLOGY | Facility: HOSPITAL | Age: 76
Discharge: HOME | End: 2021-08-31

## 2021-08-31 ENCOUNTER — HOSPITAL ENCOUNTER (OUTPATIENT)
Dept: PAIN MEDICINE | Facility: HOSPITAL | Age: 76
Discharge: HOME | End: 2021-08-31

## 2021-08-31 VITALS
OXYGEN SATURATION: 95 % | SYSTOLIC BLOOD PRESSURE: 126 MMHG | RESPIRATION RATE: 16 BRPM | DIASTOLIC BLOOD PRESSURE: 72 MMHG | HEART RATE: 58 BPM

## 2021-08-31 DIAGNOSIS — M51.36 DDD (DEGENERATIVE DISC DISEASE), LUMBAR: ICD-10-CM

## 2021-08-31 DIAGNOSIS — M54.50 LOW BACK PAIN: ICD-10-CM

## 2021-08-31 DIAGNOSIS — Z85.46 PERSONAL HISTORY OF PROSTATE CANCER: ICD-10-CM

## 2021-08-31 PROCEDURE — C1755 CATHETER, INTRASPINAL: HCPCS

## 2021-08-31 PROCEDURE — 77003 FLUOROGUIDE FOR SPINE INJECT: CPT

## 2021-08-31 PROCEDURE — 0 IOPAMIDOL 41 % SOLUTION: Performed by: ANESTHESIOLOGY

## 2021-08-31 PROCEDURE — 25010000002 METHYLPREDNISOLONE PER 80 MG: Performed by: ANESTHESIOLOGY

## 2021-08-31 RX ORDER — LIDOCAINE HYDROCHLORIDE 10 MG/ML
10 INJECTION, SOLUTION INFILTRATION; PERINEURAL ONCE
Status: COMPLETED | OUTPATIENT
Start: 2021-08-31 | End: 2021-08-31

## 2021-08-31 RX ORDER — METHYLPREDNISOLONE ACETATE 80 MG/ML
80 INJECTION, SUSPENSION INTRA-ARTICULAR; INTRALESIONAL; INTRAMUSCULAR; SOFT TISSUE ONCE
Status: COMPLETED | OUTPATIENT
Start: 2021-08-31 | End: 2021-08-31

## 2021-08-31 RX ADMIN — METHYLPREDNISOLONE ACETATE 80 MG: 80 INJECTION, SUSPENSION INTRA-ARTICULAR; INTRALESIONAL; INTRAMUSCULAR; SOFT TISSUE at 11:53

## 2021-08-31 RX ADMIN — LIDOCAINE HYDROCHLORIDE 3 ML: 10 INJECTION, SOLUTION EPIDURAL; INFILTRATION; INTRACAUDAL; PERINEURAL at 11:51

## 2021-08-31 RX ADMIN — IOPAMIDOL 3 ML: 408 INJECTION, SOLUTION INTRATHECAL at 11:52

## 2021-09-01 RX ORDER — TAMSULOSIN HYDROCHLORIDE 0.4 MG/1
CAPSULE ORAL
Qty: 90 CAPSULE | Refills: 3 | Status: SHIPPED | OUTPATIENT
Start: 2021-09-01 | End: 2022-09-23

## 2021-09-08 ENCOUNTER — TREATMENT (OUTPATIENT)
Dept: PHYSICAL THERAPY | Facility: CLINIC | Age: 76
End: 2021-09-08

## 2021-09-08 DIAGNOSIS — M54.50 ACUTE BILATERAL LOW BACK PAIN, UNSPECIFIED WHETHER SCIATICA PRESENT: Primary | ICD-10-CM

## 2021-09-08 DIAGNOSIS — R26.2 DIFFICULTY IN WALKING: ICD-10-CM

## 2021-09-08 DIAGNOSIS — M54.16 LUMBAR BACK PAIN WITH RADICULOPATHY AFFECTING LOWER EXTREMITY: ICD-10-CM

## 2021-09-08 DIAGNOSIS — M51.36 DDD (DEGENERATIVE DISC DISEASE), LUMBAR: ICD-10-CM

## 2021-09-08 PROCEDURE — 97110 THERAPEUTIC EXERCISES: CPT | Performed by: PHYSICAL THERAPIST

## 2021-09-08 PROCEDURE — 97012 MECHANICAL TRACTION THERAPY: CPT | Performed by: PHYSICAL THERAPIST

## 2021-09-08 NOTE — PROGRESS NOTES
Physical Therapy Daily Progress Note    Patient: Jim Frances   : 1945  Diagnosis/ICD-10 Code:  Acute bilateral low back pain, unspecified whether sciatica present [M54.5]  Referring practitioner: Robby Henley MD  Date of Initial Visit: Type: THERAPY  Noted: 2021  Today's Date: 2021  Patient seen for 4 sessions         Jim Frances reports: Pt reports that he is feeling much better today. Reports that he had an epidural injection 21 and says he has no more sharp pains. He reports he is doing his HEP at home everyday and takes the dog out without problems. Pt reports relief with ILT in clinic and has been using his inversion table for about 5 mins at a time at home with relief.       Subjective     Objective   See Exercise, Manual, and Modality Logs for complete treatment.       Assessment & Plan     Assessment  Assessment details: Pt tolerated stretches and abdominal TE well today with no reports of exacerbation of symptoms. Pt required cues for proper technique with cat/camel to improve flexion and extension sequencing. Quadruped SL hip extension TE added without pain. Verbal and tactile cues provided to improve TA contraction to decrease hip rotation compensation. Pt will continue Rx 2x/week to improve lumbo-pelvic stabilization and  core and hip strengthening to decrease lumbar pain with functional activities.         Progress per Plan of Care           Manual Therapy:         mins  27836;  Therapeutic Exercise:   35     mins  43390;     Neuromuscular Jb:        mins  57629;    Therapeutic Activity:          mins  78667;     Gait Training:           mins  54982;     Ultrasound:          mins  73975;    Electrical Stimulation:         mins  95541 ( );  Dry Needling          mins self-pay  Lumbar Traction          15   42308    Timed Treatment:   35   mins   Total Treatment:     56   mins    Juliana Machuca, Student PT  Edilma Farmer, PT  Physical  Therapist

## 2021-09-13 ENCOUNTER — TREATMENT (OUTPATIENT)
Dept: PHYSICAL THERAPY | Facility: CLINIC | Age: 76
End: 2021-09-13

## 2021-09-13 DIAGNOSIS — M54.16 LUMBAR BACK PAIN WITH RADICULOPATHY AFFECTING LOWER EXTREMITY: ICD-10-CM

## 2021-09-13 DIAGNOSIS — M51.36 DDD (DEGENERATIVE DISC DISEASE), LUMBAR: Primary | ICD-10-CM

## 2021-09-13 DIAGNOSIS — M54.50 ACUTE BILATERAL LOW BACK PAIN, UNSPECIFIED WHETHER SCIATICA PRESENT: ICD-10-CM

## 2021-09-13 DIAGNOSIS — R26.2 DIFFICULTY IN WALKING: ICD-10-CM

## 2021-09-13 PROCEDURE — 97110 THERAPEUTIC EXERCISES: CPT | Performed by: PHYSICAL THERAPIST

## 2021-09-13 PROCEDURE — 97012 MECHANICAL TRACTION THERAPY: CPT | Performed by: PHYSICAL THERAPIST

## 2021-09-13 NOTE — PROGRESS NOTES
Physical Therapy Daily Progress Note        Patient: Jim Frances   : 1945  Diagnosis/ICD-10 Code:  DDD (degenerative disc disease), lumbar [M51.36]  Referring practitioner: Robby Henley MD  Date of Initial Visit: Type: THERAPY  Noted: 2021  Today's Date: 2021  Patient seen for 5 sessions         iJm Frances reports: he's been doing good since he got his epidural.  He reported no pain today.         Subjective     Objective   See Exercise, Manual, and Modality Logs for complete treatment.       Assessment/Plan  Progressed core strengthening this date to include standing/functional positions.  He reported no complaints with progressions this date.  Increased traction weight/pull for greater relief and pt reported no complaints.  Will monitor tolerance to changes and update HEP next session if tolerated.      Progress per Plan of Care           Manual Therapy:         mins  32800;  Therapeutic Exercise:    25     mins  92299;     Neuromuscular Jb:        mins  30625;    Therapeutic Activity:     5     mins  30432;     Gait Training:           mins  92610;     Ultrasound:          mins  50417;    Electrical Stimulation:         mins  31512 ( );  Dry Needling          mins self-pay  Traction  15 mins    Timed Treatment:   30   mins   Total Treatment:     58   mins    Doreen Oneill PTA  Physical Therapist Assistant

## 2021-09-15 ENCOUNTER — TREATMENT (OUTPATIENT)
Dept: PHYSICAL THERAPY | Facility: CLINIC | Age: 76
End: 2021-09-15

## 2021-09-15 DIAGNOSIS — M54.50 ACUTE BILATERAL LOW BACK PAIN, UNSPECIFIED WHETHER SCIATICA PRESENT: ICD-10-CM

## 2021-09-15 DIAGNOSIS — M54.16 LUMBAR BACK PAIN WITH RADICULOPATHY AFFECTING LOWER EXTREMITY: ICD-10-CM

## 2021-09-15 DIAGNOSIS — R26.2 DIFFICULTY IN WALKING: ICD-10-CM

## 2021-09-15 DIAGNOSIS — M51.36 DDD (DEGENERATIVE DISC DISEASE), LUMBAR: Primary | ICD-10-CM

## 2021-09-15 PROCEDURE — 97110 THERAPEUTIC EXERCISES: CPT | Performed by: PHYSICAL THERAPIST

## 2021-09-15 NOTE — PROGRESS NOTES
Physical Therapy Daily Progress Note        Patient: Jim Frances   : 1945  Diagnosis/ICD-10 Code:  DDD (degenerative disc disease), lumbar [M51.36]  Referring practitioner: Robby Henley MD  Date of Initial Visit: Type: THERAPY  Noted: 2021  Today's Date: 9/15/2021  Patient seen for 6 sessions         Jim Frances reports: no problems after last session.  No pain today.  He reported compliance with HEP and use of inversion table.         Subjective     Objective   See Exercise, Manual, and Modality Logs for complete treatment.       Assessment/Plan  Pt continued to report no pain at the start of the session and has been doing good since he got his epidural.  Able to progress exercises this date with some muscle fatigue reported with hip ABD however no other complaints.  Updated HEP to reflect changes from last 2 sessions.  Held traction as pt was pain free and reported compliance with inversion table.  Will monitor symtpoms and reintroduce if necessary however discussed discharge to [facility] for self management soon if pt continues to be pain free.     Progress per Plan of Care           Manual Therapy:         mins  24461;  Therapeutic Exercise:    35     mins  51631;     Neuromuscular Jb:        mins  48938;    Therapeutic Activity:          mins  79416;     Gait Training:           mins  84423;     Ultrasound:          mins  11354;    Electrical Stimulation:         mins  31283 ( );  Dry Needling          mins self-pay    Timed Treatment:   35   mins   Total Treatment:     35   mins    Doreen Oneill PTA  Physical Therapist Assistant

## 2021-09-20 ENCOUNTER — TREATMENT (OUTPATIENT)
Dept: PHYSICAL THERAPY | Facility: CLINIC | Age: 76
End: 2021-09-20

## 2021-09-20 DIAGNOSIS — M54.50 ACUTE BILATERAL LOW BACK PAIN, UNSPECIFIED WHETHER SCIATICA PRESENT: ICD-10-CM

## 2021-09-20 DIAGNOSIS — M51.36 DDD (DEGENERATIVE DISC DISEASE), LUMBAR: Primary | ICD-10-CM

## 2021-09-20 DIAGNOSIS — M54.16 LUMBAR BACK PAIN WITH RADICULOPATHY AFFECTING LOWER EXTREMITY: ICD-10-CM

## 2021-09-20 DIAGNOSIS — R26.2 DIFFICULTY IN WALKING: ICD-10-CM

## 2021-09-20 PROCEDURE — 97110 THERAPEUTIC EXERCISES: CPT | Performed by: PHYSICAL THERAPIST

## 2021-09-20 PROCEDURE — 97012 MECHANICAL TRACTION THERAPY: CPT | Performed by: PHYSICAL THERAPIST

## 2021-09-20 NOTE — PROGRESS NOTES
Physical Therapy Daily Progress Note        Patient: Jim Frances   : 1945  Diagnosis/ICD-10 Code:  DDD (degenerative disc disease), lumbar [M51.36]  Referring practitioner: Robby Henley MD  Date of Initial Visit: Type: THERAPY  Noted: 2021  Today's Date: 2021  Patient seen for 7 sessions         Jim Frances reports: he is doing fair today.  His epidural is starting to wear off.  He has pain mainly on the (L) side instead of all over and is a 3-4/10 when he is on his feet. Pt reported he was late d/t traffic.        Subjective     Objective   See Exercise, Manual, and Modality Logs for complete treatment.       Assessment/Plan  Reviewed exercises to assess for pain d/t increased pain since last session.  No modifications required this date.  Reintroduced traction d/t increased symptoms.  Instructed to continue with HEP as no increased pain with exercises.  Also discussed increasing angle of inversion table for greater relief.  Will continue to monitor symptoms and modify or progress as able to progress towards self-management of condition.    Progress per Plan of Care           Manual Therapy:         mins  47404;  Therapeutic Exercise:    25     mins  95594;     Neuromuscular Jb:        mins  72951;    Therapeutic Activity:          mins  33678;     Gait Training:           mins  58064;     Ultrasound:          mins  35513;    Electrical Stimulation:         mins  87572 ( );  Dry Needling          mins self-pay  Traction  15 min    Timed Treatment:   25   mins   Total Treatment:     60   mins    Doreen Oneill PTA  Physical Therapist Assistant

## 2021-09-22 ENCOUNTER — TREATMENT (OUTPATIENT)
Dept: PHYSICAL THERAPY | Facility: CLINIC | Age: 76
End: 2021-09-22

## 2021-09-22 DIAGNOSIS — R26.2 DIFFICULTY IN WALKING: ICD-10-CM

## 2021-09-22 DIAGNOSIS — M51.36 DDD (DEGENERATIVE DISC DISEASE), LUMBAR: Primary | ICD-10-CM

## 2021-09-22 DIAGNOSIS — M54.50 ACUTE BILATERAL LOW BACK PAIN, UNSPECIFIED WHETHER SCIATICA PRESENT: ICD-10-CM

## 2021-09-22 DIAGNOSIS — M54.16 LUMBAR BACK PAIN WITH RADICULOPATHY AFFECTING LOWER EXTREMITY: ICD-10-CM

## 2021-09-22 PROCEDURE — 97012 MECHANICAL TRACTION THERAPY: CPT | Performed by: PHYSICAL THERAPIST

## 2021-09-22 PROCEDURE — 97110 THERAPEUTIC EXERCISES: CPT | Performed by: PHYSICAL THERAPIST

## 2021-09-22 NOTE — PROGRESS NOTES
Re-Assessment / Re-Certification      Patient: Jim Frances   : 1945  Diagnosis/ICD-10 Code:  DDD (degenerative disc disease), lumbar [M51.36]  Referring practitioner: Robby Henley MD  Date of Initial Visit: Type: THERAPY  Noted: 2021  Today's Date: 2021  Patient seen for 8 sessions      Subjective:   Jim Frances reports: Pt reports he is doing fair today. 3/10 on the L hip. Pt reports that he thinks his inversion table is working well at home. Pt feels as though the epidural he had may be wearing off on the left side. Reports no problems sleeping or sitting. Pt reports that with PT and the epidural he feels 60-70% better.    Subjective Questionnaire: back Index: 40%  Clinical Progress: improved  Home Program Compliance: Yes  Treatment has included: therapeutic exercise, therapeutic activity, lumbar traction, moist heat, HEP    Subjective   Objective          Palpation   Left   Tenderness of the proximal biceps femoris.     Left Hip Palpation Comments   Proximal biceps femoris: minimal.     Neurological Testing     Reflexes   Left   Achilles (S1): trace (1+)    Right   Achilles (S1): trace (1+)    Active Range of Motion     Lumbar   Flexion: WFL  Extension: WFL  Left lateral flexion: WFL  Right lateral flexion: WFL  Left rotation: WFL  Right rotation: WFL    Additional Active Range of Motion Details  Dull ache in L piriformis with extension - not referred into B legs    Strength/Myotome Testing     Left Knee   Extension: 5    Right Knee   Extension: 5    Tests     Left Hip   Negative Ely's and piriformis.     Right Hip   Negative Ely's and piriformis.     Additional Tests Details  Minimal tightness in B piriformis      Assessment & Plan     Assessment  Assessment details: Pt tolerated supine, prone, and standing lumbo-pelvic stabilization TE well today without increase in pain. Pt required cues with quadruped TE to maintain abdominal contracton without arching back. Pt cued for  proper form with cat/camel and side-lying hip abduction TE for appropriate muscle activation. Pt has met 4/4 STGs and 3/4 LTGs. Pt is appropriate for discharge at this time and to transition to HEP for self management of symptoms. Pt provided with updated HEP.      Progress toward previous goals: Partially Met      Plan Goals: STGs: 2-4 weeks  1.  Decrease LBP and B LE pain to a 6/10 with standing and ambulation MET  2.  Pt is compliant with his HEP MET  3.  Decrease B piriformis and SI tenderness to minimal to none with palpation MET  4.  Increase B piriformis flexibility to minimal MET      LTGs: 4-8 weeks  1.  Decrease LBP and B LE pain to a 3/10 with standing and ambulation.  MET   2.  Increase B piriformis flexibility to WNL  PROGRESSING   3.  Pt is independent with HEP MET  4.  Increase right hamstring strength to 5/5 MET      Recommendations: Discharge      PT Signature: Edilma Farmer, PT, Juliana Machuca, Student PT      Based upon review of the patient's progress and continued therapy plan, it is my medical opinion that Jim Frances should continue physical therapy treatment at [facility]  [address] [place] [id number] [phone].    Signature: __________________________________  Robby Henley MD    Manual Therapy:         mins  94174;  Therapeutic Exercise:   32      mins  14753;     Neuromuscular Jb:        mins  90513;    Therapeutic Activity:          mins  04889;     Gait Training:           mins  85612;     Ultrasound:          mins  04618;    Electrical Stimulation:         mins  41854 ( );  Dry Needling          mins self-pay  Lumbar Traction            15      Timed Treatment:   32   mins   Total Treatment:     70   mins

## 2021-10-11 DIAGNOSIS — M1A.0710 CHRONIC IDIOPATHIC GOUT INVOLVING TOE OF RIGHT FOOT WITHOUT TOPHUS: ICD-10-CM

## 2021-10-12 RX ORDER — ALLOPURINOL 300 MG/1
TABLET ORAL
Qty: 90 TABLET | Refills: 0 | Status: SHIPPED | OUTPATIENT
Start: 2021-10-12 | End: 2021-12-10 | Stop reason: SDUPTHER

## 2021-12-10 ENCOUNTER — OFFICE VISIT (OUTPATIENT)
Dept: FAMILY MEDICINE CLINIC | Facility: CLINIC | Age: 76
End: 2021-12-10

## 2021-12-10 VITALS
TEMPERATURE: 96.9 F | HEART RATE: 65 BPM | SYSTOLIC BLOOD PRESSURE: 164 MMHG | BODY MASS INDEX: 26.22 KG/M2 | WEIGHT: 177 LBS | DIASTOLIC BLOOD PRESSURE: 80 MMHG | RESPIRATION RATE: 16 BRPM | HEIGHT: 69 IN | OXYGEN SATURATION: 99 %

## 2021-12-10 DIAGNOSIS — I10 ESSENTIAL HYPERTENSION: Primary | ICD-10-CM

## 2021-12-10 DIAGNOSIS — M51.36 DDD (DEGENERATIVE DISC DISEASE), LUMBAR: ICD-10-CM

## 2021-12-10 DIAGNOSIS — M1A.0710 CHRONIC IDIOPATHIC GOUT INVOLVING TOE OF RIGHT FOOT WITHOUT TOPHUS: ICD-10-CM

## 2021-12-10 PROCEDURE — 99214 OFFICE O/P EST MOD 30 MIN: CPT | Performed by: FAMILY MEDICINE

## 2021-12-10 RX ORDER — LISINOPRIL 20 MG/1
20 TABLET ORAL DAILY
Qty: 90 TABLET | Refills: 1 | Status: SHIPPED | OUTPATIENT
Start: 2021-12-10 | End: 2022-11-15 | Stop reason: SDUPTHER

## 2021-12-10 RX ORDER — ALLOPURINOL 300 MG/1
300 TABLET ORAL DAILY
Qty: 90 TABLET | Refills: 3 | Status: SHIPPED | OUTPATIENT
Start: 2021-12-10

## 2021-12-10 NOTE — PROGRESS NOTES
"  Chief Complaint   Patient presents with   • Hypertension   • Gout       Subjective     Patient here for follow-up of elevated blood pressure.    He is not exercising and is adherent to a low-salt diet.    Blood pressure is not well controlled at home.   Cardiac symptoms: none.   Patient denies: chest pressure/discomfort, claudication, cough, dyspnea, exertional chest pressure/discomfort, fatigue, irregular heart beat, lower extremity edema, near-syncope, orthopnea, palpitations, paroxysmal nocturnal dyspnea, syncope and tachypnea.   Cardiovascular risk factors: advanced age (older than 55 for men, 65 for women), hypertension and male gender.   Use of agents associated with hypertension: amphetamines.   History of target organ damage: none.  Patient is taking prescribed hypertension medications as prescribed without side effects.  BP is up  Takes NSAIDs and adderall.    Lumbar pain is much better      The following portions of the patient's history were reviewed and updated as appropriate: allergies, current medications, past family history, past medical history, past social history, past surgical history and problem list.    Review of Systems  Pertinent items are noted in HPI.         Vitals:    12/10/21 0757 12/10/21 0813 12/10/21 0815   BP: 146/82 160/80 164/80   BP Location: Left arm Right arm Left arm   Patient Position: Sitting Sitting    Cuff Size: Adult Adult    Pulse: 65     Resp:   16   Temp: 96.9 °F (36.1 °C)     TempSrc: Temporal     SpO2: 99%     Weight: 80.3 kg (177 lb)     Height: 175.3 cm (69\")       BP Readings from Last 3 Encounters:   12/10/21 164/80   08/31/21 126/72   08/13/21 142/80     Objective      Gen: alert, pleasant.  Neck: no bruit, no enlarged thyroid  Lungs: CTA  Heart: RR, no murmur  Feet: no edema  Pulses: intact       Assessment/Plan   Hypertension, stage 2 Evidence of target organ damage: none.    Diagnoses and all orders for this visit:    1. Essential hypertension (Primary)  -   "   lisinopril (PRINIVIL,ZESTRIL) 20 MG tablet; Take 1 tablet by mouth Daily. Indications: High Blood Pressure Disorder  Dispense: 90 tablet; Refill: 1    2. Chronic idiopathic gout involving toe of right foot without tophus  -     allopurinol (ZYLOPRIM) 300 MG tablet; Take 1 tablet by mouth Daily. Indications: Gout  Dispense: 90 tablet; Refill: 3    3. DDD (degenerative disc disease), lumbar      Medication: increase to 20 of lisinopril.  Follow up: 6 months and as needed.    There are no Patient Instructions on file for this visit.  Medications Discontinued During This Encounter   Medication Reason   • allopurinol (ZYLOPRIM) 300 MG tablet Reorder   • lisinopril (PRINIVIL,ZESTRIL) 10 MG tablet Reorder        Return in about 6 months (around 6/10/2022) for blood pressure, Medicare Wellness visit, new medication follow up.    Limit salt  Limit alcoholic drinks to 1 a day  Limit caffeine to 1-2 servings a day    Dr. Robby Henley MD  [place]  [facility].

## 2022-01-05 RX ORDER — LIDOCAINE 50 MG/G
PATCH TOPICAL
Qty: 90 PATCH | Refills: 1 | Status: SHIPPED | OUTPATIENT
Start: 2022-01-05

## 2022-03-28 ENCOUNTER — OFFICE VISIT (OUTPATIENT)
Dept: FAMILY MEDICINE CLINIC | Facility: CLINIC | Age: 77
End: 2022-03-28

## 2022-03-28 VITALS
WEIGHT: 178 LBS | DIASTOLIC BLOOD PRESSURE: 70 MMHG | TEMPERATURE: 97.1 F | HEIGHT: 69 IN | OXYGEN SATURATION: 99 % | SYSTOLIC BLOOD PRESSURE: 120 MMHG | BODY MASS INDEX: 26.36 KG/M2 | HEART RATE: 77 BPM

## 2022-03-28 DIAGNOSIS — K59.09 OTHER CONSTIPATION: Primary | ICD-10-CM

## 2022-03-28 PROCEDURE — 99212 OFFICE O/P EST SF 10 MIN: CPT | Performed by: FAMILY MEDICINE

## 2022-03-28 NOTE — PROGRESS NOTES
"  Subjective   Jim Frances is a 76 y.o. male who is here for   Chief Complaint   Patient presents with   • Constipation     Past week-using otc miralax, now having bowels after eating    .     History of Present Illness   Constipation: Patient complains of constipation.  Stool pattern has been 1 formed stool(s) per few days. Onset was 3 weeks ago Defecation has been difficult. Co-Morbid conditions:none. Symptoms have been gradually improving. Current Health Habits: Eating fiber? yes, amt , Exercise?no Water intake? yes, amt , Current OTC/RX therapy has been miraLax which has been effective.  Patient reports he normally has 1 BM daily.  Sometimes 2 after a meal.  About 3 to 4 weeks ago he began having constipation problems with no BMs for several days.  There was no change in diet or medications.  He began using over-the-counter MiraLAX with good results.  Now he is having 2-3 smaller formed brown BMs with meals.  Also reported he had some pain and bloating in the suprapubic area.  This is all gone away.  Did not have excess flatulence or belching.  There is no blood or mucus in stool.  He has no known GI problems.  Did have some discomfort around the coccyx area no specific injury.  There is no overlying skin sore area.  Denies any current obvious hemorrhoids.          The following portions of the patient's history were reviewed and updated as appropriate: allergies, current medications, past family history, past medical history, past social history, past surgical history and problem list.    Review of Systems    Objective   Vitals:    03/28/22 1050   BP: 120/70   BP Location: Left arm   Patient Position: Sitting   Cuff Size: Adult   Pulse: 77   Temp: 97.1 °F (36.2 °C)   SpO2: 99%   Weight: 80.7 kg (178 lb)   Height: 175.3 cm (69\")      Physical Exam  Abdominal:      General: Bowel sounds are normal.      Palpations: Abdomen is soft.      Tenderness: There is no abdominal tenderness.       Neurological:      " Mental Status: He is alert.         Assessment/Plan   Diagnoses and all orders for this visit:    1. Other constipation (Primary)    Resolved mild intermittent constipation.    There are no Patient Instructions on file for this visit.    Medications Discontinued During This Encounter   Medication Reason   • HYDROcodone-acetaminophen (Norco) 5-325 MG per tablet *Therapy completed        No follow-ups on file.    Dr. Robby Henley  [place], [place]

## 2022-04-01 ENCOUNTER — TELEPHONE (OUTPATIENT)
Dept: FAMILY MEDICINE CLINIC | Facility: CLINIC | Age: 77
End: 2022-04-01

## 2022-04-01 NOTE — TELEPHONE ENCOUNTER
Try hydrogen-peroxide, then the alcohol and warm water flushes over the weekend.  If no success come in next week for a ear flush on Allyssa Frank's schedule

## 2022-04-01 NOTE — TELEPHONE ENCOUNTER
Caller: Jim Frances    Relationship to patient: Self    Best call back number: [phone]    Patient is needing: PATIENT WENT FOR HEARING TEST AND WAS TOLD HE HAS EAR WAX BUILD UP AND WOULD LIKE TO KNOW IF HE CAN COME INTO THE OFFICE TO GET THAT REMOVED. PLEASE REACH OUT TO PATIENT

## 2022-04-01 NOTE — TELEPHONE ENCOUNTER
Hub to relay    Unable to reach pt- please relay for him to use hydrogen peroxide and then rubbing alcohol with warm water to flush out ears over the weekend. If no success he can call next week to be seen/ ears cleaned.  is out all next week so he can try and schedule with Allyssa Sheldon who covers him when out.

## 2022-04-04 NOTE — TELEPHONE ENCOUNTER
PATIENT UNDERSTOOD HUB TO READ.  PATIENT STATED WILL TRY THE METHOD SUGGESTED AND IF NOT SUCCESSFUL WILL CALL TO GET AN APPT.

## 2022-05-16 ENCOUNTER — TELEPHONE (OUTPATIENT)
Dept: FAMILY MEDICINE CLINIC | Facility: CLINIC | Age: 77
End: 2022-05-16

## 2022-05-16 NOTE — TELEPHONE ENCOUNTER
Pt states he is having discomfort in his right groin area for the past few days. No lump that he can feel, only has been working outside In the yard. Should he come in to be seen or x ray be needed? Please advise.

## 2022-05-16 NOTE — TELEPHONE ENCOUNTER
Caller: Jim Frances    Relationship: Self    Best call back number: [phone]    What is the best time to reach you: ANYTIME    Who are you requesting to speak with (clinical staff, provider,  specific staff member: SVETLANA PINZON  What was the call regarding: PATIENT IS REQUESTING A CALL BACK FROM ALBERTA.     PATIENT DID NOT GO INTO DETAIL ON WHAT IT IS IN REGARDS TO.

## 2022-05-17 ENCOUNTER — OFFICE VISIT (OUTPATIENT)
Dept: FAMILY MEDICINE CLINIC | Facility: CLINIC | Age: 77
End: 2022-05-17

## 2022-05-17 VITALS
DIASTOLIC BLOOD PRESSURE: 80 MMHG | HEART RATE: 65 BPM | TEMPERATURE: 96.9 F | OXYGEN SATURATION: 98 % | HEIGHT: 69 IN | SYSTOLIC BLOOD PRESSURE: 138 MMHG | WEIGHT: 170 LBS | BODY MASS INDEX: 25.18 KG/M2

## 2022-05-17 DIAGNOSIS — S76.211A STRAIN OF GROIN, RIGHT, INITIAL ENCOUNTER: Primary | ICD-10-CM

## 2022-05-17 PROCEDURE — 99213 OFFICE O/P EST LOW 20 MIN: CPT | Performed by: FAMILY MEDICINE

## 2022-05-17 NOTE — PROGRESS NOTES
"  Subjective   Jim Frances is a 76 y.o. male who is here for   Chief Complaint   Patient presents with   • Groin Pain     Right side- sore x 2-3 days, only working outside in the yard    .   Pt states he is having discomfort in his right groin area for the past few days. No lump that he can feel, only has been working outside In the yard. Should he come in to be seen or x ray be needed? Please advise.   History of Present Illness     Pulled right groin 2 days ago, shoveling in yard    The following portions of the patient's history were reviewed and updated as appropriate: allergies, current medications, past medical history, past social history, past surgical history and problem list.    Review of Systems    Objective   Vitals:    05/17/22 0937   BP: 138/80   BP Location: Left arm   Patient Position: Sitting   Cuff Size: Large Adult   Pulse: 65   Temp: 96.9 °F (36.1 °C)   SpO2: 98%   Weight: 77.1 kg (170 lb)   Height: 175.3 cm (69\")      Physical Exam  Abdominal:      Hernia: There is no hernia in the left inguinal area or right inguinal area.   Genitourinary:     Penis: Normal.       Testes: Normal.       Lymphadenopathy:      Lower Body: No right inguinal adenopathy. No left inguinal adenopathy.         Assessment & Plan   Diagnoses and all orders for this visit:    1. Strain of groin, right, initial encounter (Primary)    no hernia    There are no Patient Instructions on file for this visit.    There are no discontinued medications.     No follow-ups on file.    Dr. Robby Henley  [facility] [place], [place]    "

## 2022-05-19 ENCOUNTER — APPOINTMENT (OUTPATIENT)
Dept: GENERAL RADIOLOGY | Facility: HOSPITAL | Age: 77
End: 2022-05-19

## 2022-05-19 PROCEDURE — 72220 X-RAY EXAM SACRUM TAILBONE: CPT

## 2022-05-20 ENCOUNTER — TELEPHONE (OUTPATIENT)
Dept: URGENT CARE | Facility: CLINIC | Age: 77
End: 2022-05-20

## 2022-05-20 NOTE — TELEPHONE ENCOUNTER
Patient called stating that his pharmacy did not receive his prednisone prescription Spoke with Pharmacy [facility] [place] confirmed that they didn't receive it. Prescription given per APRN order. LEVI Mobley rn

## 2022-05-24 ENCOUNTER — OFFICE VISIT (OUTPATIENT)
Dept: FAMILY MEDICINE CLINIC | Facility: CLINIC | Age: 77
End: 2022-05-24

## 2022-05-24 VITALS
WEIGHT: 174 LBS | DIASTOLIC BLOOD PRESSURE: 74 MMHG | HEART RATE: 79 BPM | OXYGEN SATURATION: 98 % | TEMPERATURE: 97.3 F | HEIGHT: 69 IN | SYSTOLIC BLOOD PRESSURE: 138 MMHG | BODY MASS INDEX: 25.77 KG/M2

## 2022-05-24 DIAGNOSIS — F51.3: Primary | ICD-10-CM

## 2022-05-24 PROCEDURE — 99213 OFFICE O/P EST LOW 20 MIN: CPT | Performed by: FAMILY MEDICINE

## 2022-05-24 RX ORDER — SODIUM OXYBATE 0.5 G/ML
SOLUTION ORAL
COMMUNITY

## 2022-05-24 NOTE — PROGRESS NOTES
"  Subjective   Jim Frances is a 76 y.o. male who is here for   Chief Complaint   Patient presents with   • Fall     Fell down basement steps day after being seen here / bruised right thigh and lower back, went to  did x ray.    .     History of Present Illness     Mom Sawyer fell down his basement steps last week in the middle of the night.  He has been sleepwalking more often.  He gets up and goes to the bathroom does not remember it.  He goes to the kitchen and does things in the kitchen does not remember it.  This time unfortunately fell down the steps.  Fell all the way down  Next day he was able to get to urgent care most the pain was in his buttocks area x-ray revealed no fracture in the sacrum and coccyx.    He went and stopped his sleeping medicine Xyrem, which is okay with me.  He is aware that all sleep medications can have significant side effects.  He will be seeing his sleep specialist Dr. Au next week    The following portions of the patient's history were reviewed and updated as appropriate: allergies, current medications, past family history, past medical history, past social history, past surgical history and problem list.    Review of Systems    Objective   Vitals:    05/24/22 1510   BP: 138/74   BP Location: Left arm   Patient Position: Sitting   Cuff Size: Adult   Pulse: 79   Temp: 97.3 °F (36.3 °C)   SpO2: 98%   Weight: 78.9 kg (174 lb)   Height: 175.3 cm (69\")      Physical Exam  Musculoskeletal:        Back:         Legs:          Assessment & Plan   Diagnoses and all orders for this visit:    1. Walking during sleep (Primary)    Kirk, no significant injury due to his sleepwalking episodes.  Most likely related to his very powerful sleep aid he takes.  I agree with stopping Xyrem at this point.  The bruising will take several weeks to improve.  Follow-up with the sleep specialist as already scheduled  Continue to apply a heating pad to the areas of bruising.  Pain should resolve in a " few more weeks      There are no Patient Instructions on file for this visit.    There are no discontinued medications.     No follow-ups on file.    Dr. Robby Henley  [place], [place]

## 2022-06-07 DIAGNOSIS — I10 ESSENTIAL HYPERTENSION: Primary | ICD-10-CM

## 2022-06-07 DIAGNOSIS — E78.2 MIXED HYPERLIPIDEMIA: ICD-10-CM

## 2022-06-07 DIAGNOSIS — Z85.46 PERSONAL HISTORY OF PROSTATE CANCER: ICD-10-CM

## 2022-06-07 DIAGNOSIS — Z12.5 SCREENING FOR PROSTATE CANCER: ICD-10-CM

## 2022-06-07 DIAGNOSIS — M1A.0710 CHRONIC IDIOPATHIC GOUT INVOLVING TOE OF RIGHT FOOT WITHOUT TOPHUS: ICD-10-CM

## 2022-06-08 ENCOUNTER — TELEPHONE (OUTPATIENT)
Dept: FAMILY MEDICINE CLINIC | Facility: CLINIC | Age: 77
End: 2022-06-08

## 2022-06-08 NOTE — TELEPHONE ENCOUNTER
Hub staff attempted to follow warm transfer process and was unsuccessful     Caller: Jim Frances    Relationship to patient: Self    Best call back number: [phone]     Patient is needing: PATIENT CALLING TO FIND OUT IF HE COULD GET A LATER TIME TO GET HIS LABS ON 06/10/22

## 2022-07-14 ENCOUNTER — TELEPHONE (OUTPATIENT)
Dept: FAMILY MEDICINE CLINIC | Facility: CLINIC | Age: 77
End: 2022-07-14

## 2022-07-14 DIAGNOSIS — Z12.11 SCREEN FOR COLON CANCER: Primary | ICD-10-CM

## 2022-09-21 ENCOUNTER — TRANSCRIBE ORDERS (OUTPATIENT)
Dept: ADMINISTRATIVE | Facility: HOSPITAL | Age: 77
End: 2022-09-21

## 2022-09-21 DIAGNOSIS — C61 PROSTATE CANCER: Primary | ICD-10-CM

## 2022-09-22 DIAGNOSIS — Z85.46 PERSONAL HISTORY OF PROSTATE CANCER: ICD-10-CM

## 2022-09-23 RX ORDER — TAMSULOSIN HYDROCHLORIDE 0.4 MG/1
CAPSULE ORAL
Qty: 90 CAPSULE | Refills: 0 | Status: SHIPPED | OUTPATIENT
Start: 2022-09-23 | End: 2023-03-09

## 2022-09-30 ENCOUNTER — TRANSCRIBE ORDERS (OUTPATIENT)
Dept: ADMINISTRATIVE | Facility: HOSPITAL | Age: 77
End: 2022-09-30

## 2022-09-30 DIAGNOSIS — R97.21 INCREASING PSA LEVEL AFTER TREATMENT FOR PROSTATE CANCER: ICD-10-CM

## 2022-09-30 DIAGNOSIS — C61 PROSTATE CANCER: Primary | ICD-10-CM

## 2022-10-11 ENCOUNTER — APPOINTMENT (OUTPATIENT)
Dept: PET IMAGING | Facility: HOSPITAL | Age: 77
End: 2022-10-11

## 2022-10-11 ENCOUNTER — HOSPITAL ENCOUNTER (OUTPATIENT)
Dept: PET IMAGING | Facility: HOSPITAL | Age: 77
Discharge: HOME | End: 2022-10-11

## 2022-10-11 ENCOUNTER — HOSPITAL ENCOUNTER (OUTPATIENT)
Dept: PET IMAGING | Facility: HOSPITAL | Age: 77
End: 2022-10-11

## 2022-10-11 DIAGNOSIS — R97.21 INCREASING PSA LEVEL AFTER TREATMENT FOR PROSTATE CANCER: ICD-10-CM

## 2022-10-11 DIAGNOSIS — C61 PROSTATE CANCER: ICD-10-CM

## 2022-10-11 PROCEDURE — 78815 PET IMAGE W/CT SKULL-THIGH: CPT

## 2022-10-11 PROCEDURE — A9595 PIFLUFOLASTAT F 18 9 MCI SOLUTION PREFILLED SYRINGE: HCPCS | Performed by: UROLOGY

## 2022-10-11 PROCEDURE — 0 PIFLUFOLASTAT F 18 9 MCI SOLUTION PREFILLED SYRINGE: Performed by: UROLOGY

## 2022-10-11 RX ADMIN — PIFLUFOLASTAT F-18 1 DOSE: 80 INJECTION INTRAVENOUS at 13:11

## 2022-11-15 DIAGNOSIS — I10 ESSENTIAL HYPERTENSION: ICD-10-CM

## 2022-11-15 RX ORDER — LISINOPRIL 20 MG/1
20 TABLET ORAL DAILY
Qty: 90 TABLET | Refills: 3 | Status: SHIPPED | OUTPATIENT
Start: 2022-11-15

## 2023-03-08 DIAGNOSIS — Z85.46 PERSONAL HISTORY OF PROSTATE CANCER: ICD-10-CM

## 2023-03-09 RX ORDER — TAMSULOSIN HYDROCHLORIDE 0.4 MG/1
CAPSULE ORAL
Qty: 90 CAPSULE | Refills: 3 | Status: SHIPPED | OUTPATIENT
Start: 2023-03-09

## 2023-05-23 DIAGNOSIS — I10 ESSENTIAL HYPERTENSION: Primary | ICD-10-CM

## 2023-05-23 DIAGNOSIS — E78.2 MIXED HYPERLIPIDEMIA: ICD-10-CM

## 2023-05-23 DIAGNOSIS — M1A.0710 CHRONIC IDIOPATHIC GOUT INVOLVING TOE OF RIGHT FOOT WITHOUT TOPHUS: ICD-10-CM

## 2023-05-23 DIAGNOSIS — Z85.46 PERSONAL HISTORY OF PROSTATE CANCER: ICD-10-CM

## 2023-05-23 DIAGNOSIS — Z00.00 MEDICARE ANNUAL WELLNESS VISIT, SUBSEQUENT: ICD-10-CM

## 2023-06-02 ENCOUNTER — OFFICE VISIT (OUTPATIENT)
Dept: FAMILY MEDICINE CLINIC | Facility: CLINIC | Age: 78
End: 2023-06-02

## 2023-06-02 VITALS
SYSTOLIC BLOOD PRESSURE: 128 MMHG | WEIGHT: 185 LBS | HEIGHT: 69 IN | OXYGEN SATURATION: 98 % | TEMPERATURE: 98 F | HEART RATE: 70 BPM | BODY MASS INDEX: 27.4 KG/M2 | DIASTOLIC BLOOD PRESSURE: 70 MMHG

## 2023-06-02 DIAGNOSIS — M1A.0710 CHRONIC IDIOPATHIC GOUT INVOLVING TOE OF RIGHT FOOT WITHOUT TOPHUS: ICD-10-CM

## 2023-06-02 DIAGNOSIS — M51.36 DDD (DEGENERATIVE DISC DISEASE), LUMBAR: ICD-10-CM

## 2023-06-02 DIAGNOSIS — F33.1 MODERATE EPISODE OF RECURRENT MAJOR DEPRESSIVE DISORDER: ICD-10-CM

## 2023-06-02 DIAGNOSIS — Z00.00 MEDICARE ANNUAL WELLNESS VISIT, SUBSEQUENT: Primary | ICD-10-CM

## 2023-06-02 RX ORDER — ALLOPURINOL 300 MG/1
300 TABLET ORAL DAILY
Qty: 90 TABLET | Refills: 3 | Status: SHIPPED | OUTPATIENT
Start: 2023-06-02

## 2023-06-02 RX ORDER — SERTRALINE HYDROCHLORIDE 100 MG/1
200 TABLET, FILM COATED ORAL DAILY
Qty: 180 TABLET | Refills: 3 | Status: SHIPPED | OUTPATIENT
Start: 2023-06-02

## 2023-06-02 RX ORDER — MEGESTROL ACETATE 20 MG/1
1 TABLET ORAL DAILY
COMMUNITY
Start: 2023-05-12 | End: 2023-06-02 | Stop reason: ALTCHOICE

## 2023-06-02 NOTE — PROGRESS NOTES
The ABCs of the Annual Wellness Visit  Subsequent Medicare Wellness Visit    Subjective      Jim Frances is a 77 y.o. male who presents for a Subsequent Medicare Wellness Visit.    The following portions of the patient's history were reviewed and   updated as appropriate: allergies, current medications, past family history, past medical history, past social history, past surgical history and problem list.    Compared to one year ago, the patient feels his physical   health is worse.    Compared to one year ago, the patient feels his mental   health is worse.    Recent Hospitalizations:  He was not admitted to the hospital during the last year.       Current Medical Providers:  Patient Care Team:  Robby Henley MD as PCP - General (Family Medicine)  Matheus Dexter MD as Consulting Physician (Pulmonary Disease)  Igor Miller Jr., MD as Consulting Physician (Urology)  Ab Au MD as Consulting Physician (Sleep Medicine)  Camron Galeana MD as Consulting Physician (Urology)    Outpatient Medications Prior to Visit   Medication Sig Dispense Refill   • amphetamine-dextroamphetamine (ADDERALL) 10 MG tablet Take 1 tablet by mouth.     • fluticasone (CUTIVATE) 0.005 % ointment      • indomethacin (INDOCIN) 50 MG capsule TAKE 1 CAPSULE THREE TIMES A DAY AS NEEDED FOR MILD PAIN 90 capsule 3   • lidocaine (LIDODERM) 5 % PLACE 1 PATCH ON THE SKIN AS DIRECTED BY PROVIDER DAILY. REMOVE AND DISCARD PATCH WITHIN 12 HOURS OR AS DIRECTED BY MD 90 patch 1   • lisinopril (PRINIVIL,ZESTRIL) 20 MG tablet Take 1 tablet by mouth Daily. Indications: High Blood Pressure Disorder 90 tablet 3   • Multiple Vitamin (MULTI-VITAMIN DAILY PO) Take  by mouth.     • Pitolisant HCl (Wakix) 17.8 MG tablet      • Restasis 0.05 % ophthalmic emulsion      • tamsulosin (FLOMAX) 0.4 MG capsule 24 hr capsule TAKE 1 CAPSULE DAILY 90 capsule 3   • allopurinol (ZYLOPRIM) 300 MG tablet Take 1 tablet by mouth Daily. Indications: Gout 90  "tablet 3   • Ca, Mg, K, and Na Oxybates 500 MG/ML solution Take 9 mL by mouth.     • megestrol (MEGACE) 20 MG tablet Take 1 tablet by mouth Daily.     • melatonin 5 MG tablet tablet Take 1 tablet by mouth.     • sertraline (ZOLOFT) 100 MG tablet Take 1.5 tablets by mouth Daily for 90 days. Indications: Major Depressive Disorder 135 tablet 3   • Sodium Oxybate (Xyrem) 500 MG/ML solution Take  by mouth.       No facility-administered medications prior to visit.       No opioid medication identified on active medication list. I have reviewed chart for other potential  high risk medication/s and harmful drug interactions in the elderly.          Aspirin is not on active medication list.  Aspirin use is not indicated based on review of current medical condition/s. Risk of harm outweighs potential benefits.  .    Patient Active Problem List   Diagnosis   • Personal history of prostate cancer   • Essential hypertension   • Chronic idiopathic gout involving toe of right foot without tophus   • Recurrent major depressive disorder, in partial remission   • Mixed hyperlipidemia   • BESSIE (obstructive sleep apnea)   • Medicare annual wellness visit, subsequent   • Narcolepsy with cataplexy   • REM behavioral disorder   • DDD (degenerative disc disease), lumbar   • Walking during sleep     Advance Care Planning   Advance Care Planning     Advance Directive is not on file.  ACP discussion was held with the patient during this visit. Patient has an advance directive (not in EMR), copy requested.     Objective    Vitals:    06/02/23 1030   BP: 128/70   Pulse: 70   Temp: 98 °F (36.7 °C)   SpO2: 98%   Weight: 83.9 kg (185 lb)   Height: 175.3 cm (69.02\")     Estimated body mass index is 27.31 kg/m² as calculated from the following:    Height as of this encounter: 175.3 cm (69.02\").    Weight as of this encounter: 83.9 kg (185 lb).    BMI is >= 25 and <30. (Overweight) The following options were offered after discussion;: nutrition " counseling/recommendations      Does the patient have evidence of cognitive impairment?   No    Lab Results   Component Value Date    CHLPL 177 2023    TRIG 101 2023    HDL 44 2023     (H) 2023    VLDL 18 2023          HEALTH RISK ASSESSMENT    Smoking Status:  Social History     Tobacco Use   Smoking Status Light Smoker   • Types: Cigars   Smokeless Tobacco Never   Tobacco Comments    1 per month     Alcohol Consumption:  Social History     Substance and Sexual Activity   Alcohol Use Yes   • Alcohol/week: 14.0 standard drinks   • Types: 14 Shots of liquor per week    Comment: 2 drinks a day      Fall Risk Screen:    LEIA Fall Risk Assessment was completed, and patient is at LOW risk for falls.Assessment completed on:2023    Depression Screenin/2/2023    10:36 AM   PHQ-2/PHQ-9 Depression Screening   Little Interest or Pleasure in Doing Things 0-->not at all   Feeling Down, Depressed or Hopeless 0-->not at all   PHQ-9: Brief Depression Severity Measure Score 0       Health Habits and Functional and Cognitive Screenin/2/2023    10:35 AM   Functional & Cognitive Status   Do you have difficulty preparing food and eating? No   Do you have difficulty bathing yourself, getting dressed or grooming yourself? No   Do you have difficulty using the toilet? No   Do you have difficulty moving around from place to place? No   Do you have trouble with steps or getting out of a bed or a chair? No   Current Diet Well Balanced Diet   Dental Exam Up to date   Eye Exam Up to date   Exercise (times per week) 7 times per week   Current Exercises Include Walking   Do you need help using the phone?  No   Are you deaf or do you have serious difficulty hearing?  No   Do you need help with transportation? No   Do you need help shopping? No   Do you need help preparing meals?  No   Do you need help with housework?  No   Do you need help with laundry? No   Do you need help taking your  medications? No   Do you need help managing money? No   Do you ever drive or ride in a car without wearing a seat belt? No       Age-appropriate Screening Schedule:  Refer to the list below for future screening recommendations based on patient's age, sex and/or medical conditions. Orders for these recommended tests are listed in the plan section. The patient has been provided with a written plan.    Health Maintenance   Topic Date Due   • HEPATITIS C SCREENING  Never done   • ZOSTER VACCINE (2 of 2) 02/01/2022   • ANNUAL WELLNESS VISIT  06/11/2022   • INFLUENZA VACCINE  08/01/2023   • LIPID PANEL  05/26/2024   • COLORECTAL CANCER SCREENING  06/15/2025   • TDAP/TD VACCINES (3 - Td or Tdap) 01/11/2027   • COVID-19 Vaccine  Completed   • Pneumococcal Vaccine 65+  Completed                  CMS Preventative Services Quick Reference  Risk Factors Identified During Encounter:    Depression/Dysphoria: Current medication adjusted.  Follow up visit planned.    The above risks/problems have been discussed with the patient.  Pertinent information has been shared with the patient in the After Visit Summary.    Diagnoses and all orders for this visit:    1. Medicare annual wellness visit, subsequent (Primary)    2. Chronic idiopathic gout involving toe of right foot without tophus  -     allopurinol (ZYLOPRIM) 300 MG tablet; Take 1 tablet by mouth Daily. Indications: Gout  Dispense: 90 tablet; Refill: 3    3. Moderate episode of recurrent major depressive disorder  -     sertraline (ZOLOFT) 100 MG tablet; Take 2 tablets by mouth Daily. Indications: Major Depressive Disorder  Dispense: 180 tablet; Refill: 3    4. DDD (degenerative disc disease), lumbar  -     Ambulatory Referral to Hospital Pain Management Department    More depression at home.  Longstanding depression.  Currently on Zoloft.  Unfortunately his wife is battling lung cancer and not doing well with chemotherapy.    Current with sleep medicine.  His insomnia and  narcolepsy medications have been adjusted    Blood pressure is currently well controlled    No recent gout flares    On injectable testosterone hormone blocker through urology for his prostate cancer    Increased lumbar pain, last lumbar epidural was very effective, took the pain away for over a year.  Pain has returned, and he like to have another lumbar epidural injection.            Follow Up:   Next Medicare Wellness visit to be scheduled in 1 year.      An After Visit Summary and PPPS were made available to the patient.

## 2023-08-03 ENCOUNTER — TELEPHONE (OUTPATIENT)
Dept: PAIN MEDICINE | Facility: HOSPITAL | Age: 78
End: 2023-08-03
Payer: MEDICARE

## 2023-08-14 ENCOUNTER — HOSPITAL ENCOUNTER (OUTPATIENT)
Dept: PAIN MEDICINE | Facility: HOSPITAL | Age: 78
Discharge: HOME | End: 2023-08-14
Payer: MEDICARE

## 2023-08-14 ENCOUNTER — ANESTHESIA EVENT (OUTPATIENT)
Dept: PAIN MEDICINE | Facility: HOSPITAL | Age: 78
End: 2023-08-14
Payer: MEDICARE

## 2023-08-14 ENCOUNTER — HOSPITAL ENCOUNTER (OUTPATIENT)
Dept: GENERAL RADIOLOGY | Facility: HOSPITAL | Age: 78
Discharge: HOME | End: 2023-08-14
Payer: MEDICARE

## 2023-08-14 ENCOUNTER — ANESTHESIA (OUTPATIENT)
Dept: PAIN MEDICINE | Facility: HOSPITAL | Age: 78
End: 2023-08-14
Payer: MEDICARE

## 2023-08-14 VITALS
SYSTOLIC BLOOD PRESSURE: 132 MMHG | TEMPERATURE: 98.2 F | HEART RATE: 59 BPM | RESPIRATION RATE: 16 BRPM | OXYGEN SATURATION: 93 % | DIASTOLIC BLOOD PRESSURE: 79 MMHG

## 2023-08-14 DIAGNOSIS — M51.36 DDD (DEGENERATIVE DISC DISEASE), LUMBAR: Primary | ICD-10-CM

## 2023-08-14 DIAGNOSIS — M54.50 BACK PAIN, LUMBOSACRAL: ICD-10-CM

## 2023-08-14 PROCEDURE — 77003 FLUOROGUIDE FOR SPINE INJECT: CPT

## 2023-08-14 PROCEDURE — C1755 CATHETER, INTRASPINAL: HCPCS

## 2023-08-14 PROCEDURE — 25510000001 IOPAMIDOL 41 % SOLUTION: Performed by: ANESTHESIOLOGY

## 2023-08-14 PROCEDURE — 25010000002 METHYLPREDNISOLONE PER 80 MG: Performed by: ANESTHESIOLOGY

## 2023-08-14 RX ORDER — METHYLPREDNISOLONE ACETATE 80 MG/ML
80 INJECTION, SUSPENSION INTRA-ARTICULAR; INTRALESIONAL; INTRAMUSCULAR; SOFT TISSUE ONCE
Status: COMPLETED | OUTPATIENT
Start: 2023-08-14 | End: 2023-08-14

## 2023-08-14 RX ORDER — LIDOCAINE HYDROCHLORIDE 10 MG/ML
5 INJECTION, SOLUTION INFILTRATION; PERINEURAL ONCE
Status: COMPLETED | OUTPATIENT
Start: 2023-08-14 | End: 2023-08-14

## 2023-08-14 RX ADMIN — METHYLPREDNISOLONE ACETATE 80 MG: 80 INJECTION, SUSPENSION INTRA-ARTICULAR; INTRALESIONAL; INTRAMUSCULAR; SOFT TISSUE at 12:27

## 2023-08-14 RX ADMIN — IOPAMIDOL 10 ML: 408 INJECTION, SOLUTION INTRATHECAL at 12:27

## 2023-08-14 RX ADMIN — LIDOCAINE HYDROCHLORIDE 5 ML: 10 INJECTION, SOLUTION EPIDURAL; INFILTRATION; INTRACAUDAL; PERINEURAL at 12:23

## 2023-08-14 NOTE — ANESTHESIA PROCEDURE NOTES
PAIN Epidural block    Pre-sedation assessment completed: 8/14/2023 12:20 PM    Patient reassessed immediately prior to procedure    Patient location during procedure: pain clinic  Start Time: 8/14/2023 12:23 PM  Stop Time: 8/14/2023 12:27 PM  Indication:procedure for pain  Performed By  Anesthesiologist: Josselyn Warner MD  Preanesthetic Checklist  Completed: patient identified, risks and benefits discussed, surgical consent, monitors and equipment checked, pre-op evaluation and timeout performed  Prep:  Pt Position:prone  Sterile Tech:cap, gloves, mask and sterile barrier  Prep:chlorhexidine gluconate and isopropyl alcohol  Monitoring:blood pressure monitoring and continuous pulse oximetry  Procedure:Sedation: no     Approach:midline  Guidance: fluoroscopy  Location:lumbar  Level:3-4  Needle Type:Tuohy  Needle Gauge:22 G  Aspiration:negative  Medications:  Preservative Free Saline:5mL  Isovue:2mL  Comments:Skin infiltrated with 1% lidocaine.Depomedrol:80mg  Post Assessment:  Post-procedure: band aid applied.  Pt Tolerance:patient tolerated the procedure well with no apparent complications  Complications:no

## 2023-08-14 NOTE — H&P
MARCELLA Celis    History and Physical    Patient Name: Jim Frances  :  1945  MRN:  [id number]  Date of Admission: 2023    Subjective     Patient is a 77 y.o. male presents with chief complaint of chronic, constant, moderate, severe low back pain.  Onset of symptoms was abrupt starting 2 years ago.  Symptoms are associated/aggravated by  transitioning out of bed in am . Symptoms improve with  moving around.   Mr. Frances Zentz to the anesthesia pain clinic with a complaint of low back pain.  Mr. Frances had initially been seen in the pain clinic on 2021 with back pain and pain radiating down the backs of his legs bilaterally.  He underwent lumbar epidural steroid injection at that time and he reports he had over 90% improvement of his pain for at least 1 year.  Now he reports he having pain in his back, denies radiating pain to his buttocks or legs.  His pain is worse with transitioning out of bed in the morning and then improves with moving around.  He also can take some indomethacin to help that he also takes for gout.  He also is using an inversion table that seems to help somewhat.  He denies that his sleep is interrupted by pain, his pain is an 8 out of 10 at the worst and a 2 out of 10 at the least.  Narrative & Impression   MRI LUMBAR SPINE WO CONTRAST-  2021     HISTORY: Low back pain.     TECHNIQUE: Multiple noncontrast sagittal and axial images were obtained  through the lumbar spine.     FINDINGS:   The T12-L1 intervertebral disc appears within normal limits.     There are several bilateral renal cysts.     The L1-2 intervertebral disc appears within normal limits.     At L2-3 there is moderate broad-based disc bulge with mild inferior  extension as seen on sagittal series 7 image 6. There is bilateral  foraminal narrowing, bilateral facet joint arthropathy and moderate  canal stenosis at L2-3.     Minimal broad-based disc bulge is seen at L3-4 with minimal  "foraminal  narrowing. There is bilateral facet joint arthropathy and mild canal  stenosis at L3-4.     At L4-5 there is a moderate broad-based disc bulge with bilateral  foraminal narrowing. This finding is more severe on the right than on  the left. There is bilateral facet joint arthropathy and  mild-to-moderate canal stenosis at L4-5.     At L5-S1 there is a mild broad-based disc bulge with minimal foraminal  narrowing. There is facet joint arthropathy but no significant canal  stenosis.     Lower cord and conus appears normal.     The signal intensity of the bony structures appears within normal  limits.     No fractures or subluxation is seen.     IMPRESSION:  Multilevel lumbar degenerative disease as described. This  finding is most severe at L2-3 and L4-5. Please see full discussion  above.   I discussed performing lumbar epidural steroid injection with the risks including, not limited to, bleeding and infection, postdural puncture headache, and the fact that I could not guarantee his pain would improve, in fact may worsen, or undergo no change, and he does wish to proceed at this time.  He is awake and alert at the time of the exam with normal mood and affect.  Debility/disabilities: None    The following portions of the patients history were reviewed and updated as appropriate: current medications, allergies, past medical history, past surgical history, past family history, past social history, and problem list      Objective     Past Medical History:   Past Medical History:   Diagnosis Date    Prostate cancer     radiation and ultrasound treatment     Past Surgical History:   Past Surgical History:   Procedure Laterality Date    CHOLECYSTECTOMY      COLONOSCOPY      x2 \"both ok\"    HERNIA REPAIR      LUMBAR EPIDURAL INJECTION  2021    Dr Josselyn Warner    PROSTATE SURGERY      TONSILLECTOMY       Family History:   Family History   Problem Relation Age of Onset    Heart disease Mother 94    Diabetes " Mother     Hypertension Mother     Arthritis Mother     Stroke Father     Alcohol abuse Father     No Known Problems Sister     Leukemia Brother         bone marrow transplant    No Known Problems Sister      Social History:   Social History     Socioeconomic History    Marital status:      Spouse name: Nataliia    Number of children: 1   Tobacco Use    Smoking status: Light Smoker     Types: Cigars    Smokeless tobacco: Never    Tobacco comments:     1 per month   Vaping Use    Vaping Use: Never used   Substance and Sexual Activity    Alcohol use: Yes     Alcohol/week: 14.0 standard drinks     Types: 14 Shots of liquor per week     Comment: 2 drinks a day     Drug use: No    Sexual activity: Not Currently     Partners: Female     Birth control/protection: Post-menopausal       Vital Signs Range for the last 24 hours  Temperature: Temp:  [98.2 øF (36.8 øC)] 98.2 øF (36.8 øC)   Temp Source: Temp src: Oral   BP: BP: (132)/(79) 132/79   Pulse: Heart Rate:  [76] 76   Respirations: Resp:  [16] 16   SPO2: SpO2:  [93 %] 93 %   O2 Amount (l/min):     O2 Devices Device (Oxygen Therapy): room air   Weight:           --------------------------------------------------------------------------------    Current Outpatient Medications   Medication Sig Dispense Refill    allopurinol (ZYLOPRIM) 300 MG tablet Take 1 tablet by mouth Daily. Indications: Gout 90 tablet 3    amphetamine-dextroamphetamine (ADDERALL) 10 MG tablet Take 1 tablet by mouth.      fluticasone (CUTIVATE) 0.005 % ointment       indomethacin (INDOCIN) 50 MG capsule TAKE 1 CAPSULE THREE TIMES A DAY AS NEEDED FOR MILD PAIN 90 capsule 3    lidocaine (LIDODERM) 5 % PLACE 1 PATCH ON THE SKIN AS DIRECTED BY PROVIDER DAILY. REMOVE AND DISCARD PATCH WITHIN 12 HOURS OR AS DIRECTED BY MD 90 patch 1    lisinopril (PRINIVIL,ZESTRIL) 20 MG tablet Take 1 tablet by mouth Daily. Indications: High Blood Pressure Disorder 90 tablet 3    Multiple Vitamin  (MULTI-VITAMIN DAILY PO) Take  by mouth.      Pitolisant HCl (Wakix) 17.8 MG tablet       Restasis 0.05 % ophthalmic emulsion       sertraline (ZOLOFT) 100 MG tablet Take 2 tablets by mouth Daily. Indications: Major Depressive Disorder 180 tablet 3    tamsulosin (FLOMAX) 0.4 MG capsule 24 hr capsule TAKE 1 CAPSULE DAILY 90 capsule 3     Current Facility-Administered Medications   Medication Dose Route Frequency Provider Last Rate Last Admin    iopamidol (ISOVUE-M 200) injection 41%  5 mL Epidural Once in imaging Josselyn Warner MD        lidocaine (XYLOCAINE) 1 % injection 5 mL  5 mL Infiltration Once Josselyn Warner MD        methylPREDNISolone acetate (DEPO-medrol) injection 80 mg  80 mg Epidural Once Josselyn Warner MD           --------------------------------------------------------------------------------  Assessment & Plan      Anesthesia Evaluation     Patient summary reviewed and Nursing notes reviewed   no history of anesthetic complications:   NPO Solid Status: N/A  NPO Liquid Status: N/A    Pain impairs ability to perform ADLs: Ambulation  Modalities previously tried to control pain with limited effectiveness within the last 4-6 weeks: Other     Airway   Mallampati: II  TM distance: >3 FB  Neck ROM: full  No difficulty expected  Dental - normal exam     Pulmonary - normal exam    breath sounds clear to auscultation  (+) ,sleep apnea on CPAP  (-) stridor  Cardiovascular - normal exam    Rhythm: regular  Rate: normal    (+) hypertension less than 2 medicationsorthopnea      Neuro/Psych  (+) psychiatric history Depression  GI/Hepatic/Renal/Endo - negative ROS     ROS Comment: gout    Musculoskeletal     (+) back pain  Abdominal  - normal exam   Substance History - negative use     OB/GYN negative ob/gyn ROS         Other   arthritis,   history of cancer (prostate) remission             Diagnosis and Plan    Treatment Plan  ASA 2   Patient has had previous injection/procedure   Procedures: Lumbar Epidural  Steroid Injection(LESI), With fluoroscopy, Without ultrasound,      Anesthetic plan and risks discussed with patient.      Diagnosis     * DDD (degenerative disc disease), lumbar [M51.36]     * Spinal stenosis [M48.00]

## 2023-08-24 ENCOUNTER — TELEPHONE (OUTPATIENT)
Dept: PAIN MEDICINE | Facility: HOSPITAL | Age: 78
End: 2023-08-24
Payer: MEDICARE

## 2023-10-26 NOTE — ANESTHESIA PROCEDURE NOTES
Medication: losartan-hydrtochlorothiazide  Last office visit date: 10/18/23  Medication Refill Protocol Failed.  Medication Refill Protocol Failed. Courtesy Refill provided after checking that patient already has a follow up appointment scheduled per protocol guidelines. Writer confirmed, courtesy refill was not a duplicate.     PAIN Epidural block    Pre-sedation assessment completed: 8/31/2021 11:49 AM    Patient reassessed immediately prior to procedure    Patient location during procedure: pain clinic  Start Time: 8/31/2021 11:50 AM  Stop Time: 8/31/2021 11:54 AM  Indication:procedure for pain  Performed By  Anesthesiologist: Josselyn Warner MD  Preanesthetic Checklist  Completed: patient identified, site marked, risks and benefits discussed, surgical consent, monitors and equipment checked, pre-op evaluation and timeout performed  Prep:  Pt Position:prone  Sterile Tech:cap, gloves, mask and sterile barrier  Prep:chlorhexidine gluconate and isopropyl alcohol  Monitoring:blood pressure monitoring and continuous pulse oximetry  Procedure:Sedation: no     Approach:midline  Guidance: fluoroscopy  Location:lumbar  Level:4-5  Needle Type:Tuohy  Needle Gauge:22 G  Aspiration:negative  Test Dose:negative  Medications:  Depomedrol:80  Preservative Free Saline:6mL  Isovue:1mL  Comments:Skin infiltration with 1% lidocaine using 27 G needle.  Post Assessment:  Post-procedure: band aid applied.  Pt Tolerance:patient tolerated the procedure well with no apparent complications  Complications:no

## 2023-10-31 ENCOUNTER — TELEPHONE (OUTPATIENT)
Dept: FAMILY MEDICINE CLINIC | Facility: CLINIC | Age: 78
End: 2023-10-31

## 2023-10-31 NOTE — TELEPHONE ENCOUNTER
Would you like to see patient for office visit to discuss back pain? Last seen in June for AWV. Please advise

## 2023-10-31 NOTE — TELEPHONE ENCOUNTER
Caller: Jim Frances    Relationship: Self    Best call back number:[phone]    What is the best time to reach you: ANYTIME    Who are you requesting to speak with (clinical staff, provider,  specific staff member):CLINICAL    What was the call regarding: PATIENT WAS WONDERING IF HE CAN GET AN ORDER FOR A BACK BRACE. PATIENT STATED HE HAS TRIED THE EPIDURAL BUT ITS NOT DOING AS GOOD.

## 2023-10-31 NOTE — TELEPHONE ENCOUNTER
Hub to share     Unable to reach pt left him a v/m that insurance does not usually cover back brace orders, he will need to purchase over the counter at [employer], [employer], or even [facility] usually has some.

## 2024-02-07 DIAGNOSIS — M51.36 DDD (DEGENERATIVE DISC DISEASE), LUMBAR: Primary | ICD-10-CM

## 2024-02-07 RX ORDER — LIDOCAINE 50 MG/G
2 PATCH TOPICAL EVERY 24 HOURS
Qty: 90 PATCH | Refills: 1 | Status: SHIPPED | OUTPATIENT
Start: 2024-02-07

## 2024-04-01 DIAGNOSIS — M51.36 DDD (DEGENERATIVE DISC DISEASE), LUMBAR: ICD-10-CM

## 2024-04-01 RX ORDER — INDOMETHACIN 50 MG/1
50 CAPSULE ORAL 3 TIMES DAILY PRN
Qty: 90 CAPSULE | Refills: 0 | Status: SHIPPED | OUTPATIENT
Start: 2024-04-01

## 2024-04-01 NOTE — TELEPHONE ENCOUNTER
Caller: Jim Frances    Relationship: Self    Best call back number: [phone]     Requested Prescriptions:   Requested Prescriptions     Pending Prescriptions Disp Refills    indomethacin (INDOCIN) 50 MG capsule 90 capsule 3     Sig: Take 1 capsule by mouth 3 (Three) Times a Day As Needed for Mild Pain.        Pharmacy where request should be sent: [facility] [address] [phone] [facility] [phone]      Last office visit with prescribing clinician: 6/2/2023   Last telemedicine visit with prescribing clinician: Visit date not found   Next office visit with prescribing clinician: Visit date not found     Additional details provided by patient: PATIENT HAS ABOUT A WEEK SUPPLY OF MEDICATION     Does the patient have less than a 3 day supply:  [] Yes  [x] No    Would you like a call back once the refill request has been completed: [] Yes [x] No    If the office needs to give you a call back, can they leave a voicemail: [] Yes [x] No    Diego Duvall Rep   04/01/24 11:51 EDT

## 2024-08-05 ENCOUNTER — TELEPHONE (OUTPATIENT)
Dept: FAMILY MEDICINE CLINIC | Facility: CLINIC | Age: 79
End: 2024-08-05
Payer: MEDICARE

## 2024-08-05 DIAGNOSIS — R73.01 IFG (IMPAIRED FASTING GLUCOSE): ICD-10-CM

## 2024-08-05 DIAGNOSIS — M1A.0710 CHRONIC IDIOPATHIC GOUT INVOLVING TOE OF RIGHT FOOT WITHOUT TOPHUS: ICD-10-CM

## 2024-08-05 DIAGNOSIS — I10 ESSENTIAL HYPERTENSION: Primary | ICD-10-CM

## 2024-08-05 NOTE — TELEPHONE ENCOUNTER
"Patient called and stated that he has been experiencing elevated BP readings over the last few days.   His BP was 160/78 today, patient stated that he did have an injection for prostate cancer today.   His previous readings have been 185/82, 173/85, 192/84 a few days ago.   Patient stated that he is not experiencing any symptoms when his BP is elevated.   Patient is asking for labs that include an A1C & Uric Acid & \"whatever Dr. Henley wants to add\" and then to have a follow up appointment with Dr. Henley.   Please advise.       Okay have ordered blood test as requested.  Will need to make a lab appointment.  If blood pressure is elevated, he can start taking 2 of his lisinopril.  Normally takes a 20 mg pill a day.  Increase the  dose to 40 mg daily  We can schedule an appointment to see him in the next week or 2    "

## 2024-08-20 ENCOUNTER — OFFICE VISIT (OUTPATIENT)
Dept: FAMILY MEDICINE CLINIC | Facility: CLINIC | Age: 79
End: 2024-08-20
Payer: MEDICARE

## 2024-08-20 VITALS
HEART RATE: 70 BPM | OXYGEN SATURATION: 98 % | DIASTOLIC BLOOD PRESSURE: 80 MMHG | TEMPERATURE: 97.7 F | SYSTOLIC BLOOD PRESSURE: 154 MMHG | BODY MASS INDEX: 26.66 KG/M2 | WEIGHT: 180 LBS | HEIGHT: 69 IN

## 2024-08-20 DIAGNOSIS — Z85.46 PERSONAL HISTORY OF PROSTATE CANCER: ICD-10-CM

## 2024-08-20 DIAGNOSIS — M1A.0710 CHRONIC IDIOPATHIC GOUT INVOLVING TOE OF RIGHT FOOT WITHOUT TOPHUS: ICD-10-CM

## 2024-08-20 DIAGNOSIS — F33.1 MODERATE EPISODE OF RECURRENT MAJOR DEPRESSIVE DISORDER: ICD-10-CM

## 2024-08-20 DIAGNOSIS — I10 ESSENTIAL HYPERTENSION: ICD-10-CM

## 2024-08-20 PROCEDURE — 1159F MED LIST DOCD IN RCRD: CPT | Performed by: FAMILY MEDICINE

## 2024-08-20 PROCEDURE — 99214 OFFICE O/P EST MOD 30 MIN: CPT | Performed by: FAMILY MEDICINE

## 2024-08-20 PROCEDURE — 1126F AMNT PAIN NOTED NONE PRSNT: CPT | Performed by: FAMILY MEDICINE

## 2024-08-20 PROCEDURE — 1160F RVW MEDS BY RX/DR IN RCRD: CPT | Performed by: FAMILY MEDICINE

## 2024-08-20 PROCEDURE — 3077F SYST BP >= 140 MM HG: CPT | Performed by: FAMILY MEDICINE

## 2024-08-20 PROCEDURE — 3079F DIAST BP 80-89 MM HG: CPT | Performed by: FAMILY MEDICINE

## 2024-08-20 RX ORDER — ALLOPURINOL 300 MG/1
300 TABLET ORAL DAILY
Qty: 90 TABLET | Refills: 3 | Status: SHIPPED | OUTPATIENT
Start: 2024-08-20

## 2024-08-20 RX ORDER — TAMSULOSIN HYDROCHLORIDE 0.4 MG/1
1 CAPSULE ORAL DAILY
Qty: 90 CAPSULE | Refills: 3 | Status: SHIPPED | OUTPATIENT
Start: 2024-08-20

## 2024-08-20 RX ORDER — SERTRALINE HYDROCHLORIDE 100 MG/1
200 TABLET, FILM COATED ORAL DAILY
Qty: 180 TABLET | Refills: 3 | Status: SHIPPED | OUTPATIENT
Start: 2024-08-20

## 2024-08-20 RX ORDER — LISINOPRIL 20 MG/1
20 TABLET ORAL DAILY
Qty: 90 TABLET | Refills: 3 | Status: SHIPPED | OUTPATIENT
Start: 2024-08-20

## 2024-08-20 NOTE — PROGRESS NOTES
Chief Complaint   Patient presents with    Hypertension     Answers submitted by the patient for this visit:  Primary Reason for Visit (Submitted on 8/13/2024)  What is the primary reason for your visit?: High Blood Pressure  High Blood Pressure Questionnaire (Submitted on 8/13/2024)  Chief Complaint: Hypertension  Chronicity: recurrent  Onset: in the past 7 days  Progression since onset: unchanged  anxiety: No  blurred vision: No  chest pain: No  headaches: No  malaise/fatigue: No  orthopnea: No  palpitations: No  peripheral edema: No  shortness of breath: No  Compliance problems: no compliance problems    Subjective     Patient here for follow-up of elevated blood pressure.    He is exercising and is adherent to a low-salt diet.    Blood pressure is not well controlled at home.   Cardiac symptoms: none.   Patient denies: chest pressure/discomfort.   Cardiovascular risk factors: advanced age (older than 55 for men, 65 for women).   Use of agents associated with hypertension: none.   History of target organ damage: none.  Patient is NOT taking prescribed hypertension medications  Patient comes back in for follow-up.  His wife passed away due to metastatic cancer this summer.  His wife is a patient of mine as well.  Overall doing fairly well.  His daughter from [place] came to visit.  He plans to go visiting out there.  He has 2 sisters who live nearby.  He generally stopped taking most of his medicines and care for himself as his sole focus was caring for his wife.  Due for routine refills  Recently bought a [employer] to fix it up as a new hobby      The following portions of the patient's history were reviewed and updated as appropriate: allergies, current medications, past family history, past medical history, past social history, past surgical history, and problem list.    Review of Systems  Pertinent items are noted in HPI.    Results for orders placed or performed in visit on 08/11/24   CBC (No Diff)     "Specimen: Blood   Result Value Ref Range    WBC 5.64 3.40 - 10.80 10*3/mm3    RBC 4.57 4.14 - 5.80 10*6/mm3    Hemoglobin 13.9 13.0 - 17.7 g/dL    Hematocrit 42.0 37.5 - 51.0 %    MCV 91.9 79.0 - 97.0 fL    MCH 30.4 26.6 - 33.0 pg    MCHC 33.1 31.5 - 35.7 g/dL    RDW 13.1 12.3 - 15.4 %    Platelets 189 140 - 450 10*3/mm3   Comprehensive Metabolic Panel    Specimen: Blood   Result Value Ref Range    Glucose 95 65 - 99 mg/dL    BUN 18 8 - 23 mg/dL    Creatinine 1.14 0.76 - 1.27 mg/dL    EGFR Result 65.8 >60.0 mL/min/1.73    BUN/Creatinine Ratio 15.8 7.0 - 25.0    Sodium 143 136 - 145 mmol/L    Potassium 4.9 3.5 - 5.2 mmol/L    Chloride 104 98 - 107 mmol/L    Total CO2 29.7 (H) 22.0 - 29.0 mmol/L    Calcium 9.4 8.6 - 10.5 mg/dL    Total Protein 7.1 6.0 - 8.5 g/dL    Albumin 4.3 3.5 - 5.2 g/dL    Globulin 2.8 gm/dL    A/G Ratio 1.5 g/dL    Total Bilirubin 0.3 0.0 - 1.2 mg/dL    Alkaline Phosphatase 88 39 - 117 U/L    AST (SGOT) 30 1 - 40 U/L    ALT (SGPT) 22 1 - 41 U/L   Hemoglobin A1c    Specimen: Blood   Result Value Ref Range    Hemoglobin A1C 5.70 (H) 4.80 - 5.60 %   Uric Acid    Specimen: Blood   Result Value Ref Range    Uric Acid 8.3 (H) 3.4 - 7.0 mg/dL        Vitals:    08/20/24 1541   BP: 154/80   BP Location: Left arm   Patient Position: Sitting   Cuff Size: Adult   Pulse: 70   Temp: 97.7 °F (36.5 °C)   SpO2: 98%   Weight: 81.6 kg (180 lb)   Height: 175.3 cm (69.02\")     BP Readings from Last 3 Encounters:   08/20/24 154/80   08/14/23 132/79   06/02/23 128/70     Objective    BMI is >= 25 and <30. (Overweight) The following options were offered after discussion;: exercise counseling/recommendations and nutrition counseling/recommendations       Gen: alert, pleasant.  Neck: no bruit, no enlarged thyroid  Lungs: CTA  Heart: RR, no murmur  Feet: no edema  Pulses: intact    Assessment & Plan   Hypertension, stage 1 Evidence of target organ damage: none.    Diagnoses and all orders for this visit:    1. 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 Indications: Gout  Dispense: 90 tablet; Refill: 3    2. Essential hypertension  -     lisinopril (PRINIVIL,ZESTRIL) 20 MG tablet; Take 1 tablet by mouth Daily. Indications: High Blood Pressure Disorder  Dispense: 90 tablet; Refill: 3    3. Moderate episode of recurrent major depressive disorder  -     sertraline (ZOLOFT) 100 MG tablet; Take 2 tablets by mouth Daily. Indications: Major Depressive Disorder  Dispense: 180 tablet; Refill: 3    4. Personal history of prostate cancer  -     tamsulosin (FLOMAX) 0.4 MG capsule 24 hr capsule; Take 1 capsule by mouth Daily. Indications: Benign Enlargement of Prostate  Dispense: 90 capsule; Refill: 3      Medication: resume lisinopril.  Follow up: 1 year year and as needed.    There are no Patient Instructions on file for this visit.  Medications Discontinued During This Encounter   Medication Reason    lisinopril (PRINIVIL,ZESTRIL) 20 MG tablet Reorder    allopurinol (ZYLOPRIM) 300 MG tablet Reorder    sertraline (ZOLOFT) 100 MG tablet Reorder    tamsulosin (FLOMAX) 0.4 MG capsule 24 hr capsule Reorder        Return in about 1 year (around 8/20/2025) for Medicare Wellness visit.    Limit salt  Limit alcoholic drinks to 1 a day  Limit caffeine to 1-2 servings a day    Dr. Robby Henley MD  [place], [place]  [facility].

## 2025-03-03 ENCOUNTER — OFFICE VISIT (OUTPATIENT)
Dept: FAMILY MEDICINE CLINIC | Facility: CLINIC | Age: 80
End: 2025-03-03
Payer: MEDICARE

## 2025-03-03 VITALS
HEART RATE: 76 BPM | BODY MASS INDEX: 26.36 KG/M2 | TEMPERATURE: 97.7 F | OXYGEN SATURATION: 98 % | WEIGHT: 178 LBS | SYSTOLIC BLOOD PRESSURE: 160 MMHG | DIASTOLIC BLOOD PRESSURE: 98 MMHG | HEIGHT: 69 IN

## 2025-03-03 DIAGNOSIS — R52 GENERALIZED BODY ACHES: ICD-10-CM

## 2025-03-03 DIAGNOSIS — I10 ESSENTIAL HYPERTENSION: ICD-10-CM

## 2025-03-03 DIAGNOSIS — R05.1 ACUTE COUGH: Primary | ICD-10-CM

## 2025-03-03 LAB
EXPIRATION DATE: NORMAL
FLUAV AG UPPER RESP QL IA.RAPID: NOT DETECTED
FLUBV AG UPPER RESP QL IA.RAPID: NOT DETECTED
INTERNAL CONTROL: NORMAL
Lab: NORMAL
SARS-COV-2 AG UPPER RESP QL IA.RAPID: NOT DETECTED

## 2025-03-03 PROCEDURE — 87428 SARSCOV & INF VIR A&B AG IA: CPT | Performed by: FAMILY MEDICINE

## 2025-03-03 PROCEDURE — 1160F RVW MEDS BY RX/DR IN RCRD: CPT | Performed by: FAMILY MEDICINE

## 2025-03-03 PROCEDURE — 1126F AMNT PAIN NOTED NONE PRSNT: CPT | Performed by: FAMILY MEDICINE

## 2025-03-03 PROCEDURE — 3077F SYST BP >= 140 MM HG: CPT | Performed by: FAMILY MEDICINE

## 2025-03-03 PROCEDURE — 99214 OFFICE O/P EST MOD 30 MIN: CPT | Performed by: FAMILY MEDICINE

## 2025-03-03 PROCEDURE — 3080F DIAST BP >= 90 MM HG: CPT | Performed by: FAMILY MEDICINE

## 2025-03-03 PROCEDURE — 1159F MED LIST DOCD IN RCRD: CPT | Performed by: FAMILY MEDICINE

## 2025-03-03 RX ORDER — LISINOPRIL 40 MG/1
40 TABLET ORAL DAILY
Qty: 30 TABLET | Refills: 0 | Status: SHIPPED | OUTPATIENT
Start: 2025-03-03 | End: 2025-03-03 | Stop reason: SDUPTHER

## 2025-03-03 RX ORDER — LISINOPRIL 40 MG/1
40 TABLET ORAL DAILY
Qty: 90 TABLET | Refills: 3 | Status: SHIPPED | OUTPATIENT
Start: 2025-03-03

## 2025-03-03 NOTE — PROGRESS NOTES
Chief Complaint   Patient presents with    Elevated Blood Pressure    Cough    Generalized Body Aches       Subjective     Patient here for follow-up of elevated blood pressure.    He is not exercising and is adherent to a low-salt diet.    Blood pressure is not well controlled at home.   Cardiac symptoms: none.   Patient denies: chest pressure/discomfort.   Cardiovascular risk factors: advanced age (older than 55 for men, 65 for women), hypertension, and male gender.   Use of agents associated with hypertension: amphetamines.   History of target organ damage: none.  Patient is taking prescribed hypertension medications as prescribed without side effects.  Currently taking 20 mg of lisinopril  Went to see his sleep medicine provider and blood pressure was elevated there  Home blood pressures are systolics are 140 although up to 190    Also recent URI.  One of his friends came over had a cough.    Still grieving the loss of his longtime wife  To use of couple sessions of grief counseling  Was also able to visit his daughter in [facility] for a month and that was very helpful      The following portions of the patient's history were reviewed and updated as appropriate: allergies, current medications, past family history, past medical history, past social history, past surgical history, and problem list.    Review of Systems  Pertinent items are noted in HPI.    Results for orders placed or performed in visit on 03/03/25   POCT SARS-CoV-2 Antigen ANDRES + Flu    Collection Time: 03/03/25  4:19 PM    Specimen: Swab   Result Value Ref Range    SARS Antigen Not Detected Not Detected, Presumptive Negative    Influenza A Antigen ANDRES Not Detected Not Detected    Influenza B Antigen ANDRES Not Detected Not Detected    Internal Control Passed Passed    Lot Number [id number]     Expiration Date 11/27/25         Vitals:    03/03/25 1557   BP: 160/98   Pulse: 76   Temp: 97.7 °F (36.5 °C)   SpO2: 98%   Weight: 80.7 kg (178 lb)  "  Height: 175.3 cm (69.02\")     BP Readings from Last 3 Encounters:   03/03/25 160/98   08/20/24 154/80   08/14/23 132/79     Objective            Gen: alert, pleasant.  Neck: no bruit, no enlarged thyroid  Lungs: CTA  Heart: RR, no murmur  Feet: no edema  Pulses: intact    Assessment & Plan   Hypertension, stage 1 Evidence of target organ damage: none.    Diagnoses and all orders for this visit:    1. Acute cough (Primary)  -     POCT SARS-CoV-2 Antigen ANDRES + Flu    2. Generalized body aches  -     POCT SARS-CoV-2 Antigen ANDRES + Flu    3. Essential hypertension  -     Discontinue: lisinopril (PRINIVIL,ZESTRIL) 40 MG tablet; Take 1 tablet by mouth Daily. Indications: High Blood Pressure  Dispense: 30 tablet; Refill: 0  -     lisinopril (PRINIVIL,ZESTRIL) 40 MG tablet; Take 1 tablet by mouth Daily. Indications: High Blood Pressure  Dispense: 90 tablet; Refill: 3    Lets double lisinopril to 40 mg  Swab for flu and COVID are negative  Call out on Thursday if not better,  Medication: increase to 40 mg lisinopril.  Follow up: 5 months and as needed.    There are no Patient Instructions on file for this visit.  Medications Discontinued During This Encounter   Medication Reason    fluticasone (CUTIVATE) 0.005 % ointment *Therapy completed    lisinopril (PRINIVIL,ZESTRIL) 20 MG tablet Reorder    lisinopril (PRINIVIL,ZESTRIL) 40 MG tablet Reorder        Return in about 4 months (around 7/3/2025) for Medicare Wellness visit.    Limit salt  Limit alcoholic drinks to 1 a day  Limit caffeine to 1-2 servings a day    Dr. Robby Henley MD  [place], [place]  [facility].  "

## 2025-03-04 RX ORDER — LISINOPRIL 40 MG/1
40 TABLET ORAL DAILY
Qty: 90 TABLET | Refills: 3 | OUTPATIENT
Start: 2025-03-04

## 2025-03-06 ENCOUNTER — TELEPHONE (OUTPATIENT)
Dept: FAMILY MEDICINE CLINIC | Facility: CLINIC | Age: 80
End: 2025-03-06

## 2025-03-06 RX ORDER — AZITHROMYCIN 250 MG/1
TABLET, FILM COATED ORAL
Qty: 6 TABLET | Refills: 0 | Status: SHIPPED | OUTPATIENT
Start: 2025-03-06

## 2025-03-06 NOTE — TELEPHONE ENCOUNTER
"  Caller: Jim Frances \"Gene\"    Relationship: Self    Best call back number:     What is the best time to reach you:     Who are you requesting to speak with (clinical staff, provider,  specific staff member):     Do you know the name of the person who called:     What was the call regarding: PATIENT IS CALLING IN STATING THAT HE SAW DR WALLACE ON 03/03/25 AND THAT IF HE WAS NOT FEELING BETTER HE NEEDS TO CALL THE OFFICE.  PATIENT SAYS HE IS STILL HAVING HIS PRODUCTIVE COUGH NOSE IS RUNNING HE FEELS WEAK AND HAS CHILLS.  HE WANTS TO BE CALLED BACK TODAY AS HE KNOWS THAT DR WALLACE IS OFF TOMORROW.       Okay as we had discussed earlier in the week, if he was not feeling better I would send in antibiotics    I will send a Z-Souleymane to the pharmacy now    "

## 2025-03-06 NOTE — TELEPHONE ENCOUNTER
METFORMIN 500MG TABLETS      Last Written Prescription Date:  3/1/23  Last Fill Quantity: 360,   # refills: 3  Last Office Visit : 1/25/24  Future Office visit:  4/25/24       GFR Estimate   Date Value Ref Range Status   10/17/2023 >90 >60 mL/min/1.73m2 Final         PT NOTIFIED

## 2025-06-09 ENCOUNTER — TELEPHONE (OUTPATIENT)
Dept: FAMILY MEDICINE CLINIC | Facility: CLINIC | Age: 80
End: 2025-06-09
Payer: MEDICARE

## 2025-06-09 DIAGNOSIS — Z12.11 SCREEN FOR COLON CANCER: Primary | ICD-10-CM

## 2025-06-09 NOTE — TELEPHONE ENCOUNTER
"  Caller: Jim Frances \"Gene\"    Relationship: Self    Best call back number: [phone]         Who are you requesting to speak with (clinical staff, provider,  specific staff member): PCP OR CLINICAL        What was the call regarding: RECEIVED EMAIL FROM ERNESTO STATING IT HAD BEEN 3 YEARS SINCE LAST ONE.  SHOULD HE REPLY TO EMAIL OR IS THERE ANOTHER WAY TO DO THE TESTING?  PLEASE CALL TO [facility].        Okay, will send Ernesto    "

## 2025-08-18 DIAGNOSIS — E78.2 MIXED HYPERLIPIDEMIA: ICD-10-CM

## 2025-08-18 DIAGNOSIS — M1A.0710 CHRONIC IDIOPATHIC GOUT INVOLVING TOE OF RIGHT FOOT WITHOUT TOPHUS: ICD-10-CM

## 2025-08-18 DIAGNOSIS — I10 ESSENTIAL HYPERTENSION: ICD-10-CM

## 2025-08-18 DIAGNOSIS — Z00.00 MEDICARE ANNUAL WELLNESS VISIT, SUBSEQUENT: ICD-10-CM

## 2025-08-18 DIAGNOSIS — Z85.46 PERSONAL HISTORY OF PROSTATE CANCER: ICD-10-CM

## 2025-08-18 DIAGNOSIS — I10 ESSENTIAL HYPERTENSION: Primary | ICD-10-CM

## 2025-08-20 LAB
ALBUMIN SERPL-MCNC: 4.4 G/DL (ref 3.8–4.8)
ALP SERPL-CCNC: 89 IU/L (ref 44–121)
ALT SERPL-CCNC: 20 IU/L (ref 0–44)
APPEARANCE UR: CLEAR
AST SERPL-CCNC: 26 IU/L (ref 0–40)
BILIRUB SERPL-MCNC: 0.3 MG/DL (ref 0–1.2)
BILIRUB UR QL STRIP: NEGATIVE
BUN SERPL-MCNC: 19 MG/DL (ref 8–27)
BUN/CREAT SERPL: 15 (ref 10–24)
CALCIUM SERPL-MCNC: 9.8 MG/DL (ref 8.6–10.2)
CHLORIDE SERPL-SCNC: 103 MMOL/L (ref 96–106)
CHOLEST SERPL-MCNC: 211 MG/DL (ref 100–199)
CO2 SERPL-SCNC: 21 MMOL/L (ref 20–29)
COLOR UR: YELLOW
CREAT SERPL-MCNC: 1.24 MG/DL (ref 0.76–1.27)
EGFRCR SERPLBLD CKD-EPI 2021: 59 ML/MIN/1.73
ERYTHROCYTE [DISTWIDTH] IN BLOOD BY AUTOMATED COUNT: 13.1 % (ref 11.6–15.4)
GLOBULIN SER CALC-MCNC: 2.8 G/DL (ref 1.5–4.5)
GLUCOSE SERPL-MCNC: 100 MG/DL (ref 70–99)
GLUCOSE UR QL STRIP: NEGATIVE
HCT VFR BLD AUTO: 39.2 % (ref 37.5–51)
HDLC SERPL-MCNC: 47 MG/DL
HGB BLD-MCNC: 13 G/DL (ref 13–17.7)
HGB UR QL STRIP: NEGATIVE
KETONES UR QL STRIP: NEGATIVE
LDLC SERPL CALC-MCNC: 137 MG/DL (ref 0–99)
LDLC/HDLC SERPL: 2.9 RATIO (ref 0–3.6)
LEUKOCYTE ESTERASE UR QL STRIP: NEGATIVE
MCH RBC QN AUTO: 31.8 PG (ref 26.6–33)
MCHC RBC AUTO-ENTMCNC: 33.2 G/DL (ref 31.5–35.7)
MCV RBC AUTO: 96 FL (ref 79–97)
MICRO URNS: NORMAL
NITRITE UR QL STRIP: NEGATIVE
PH UR STRIP: 5.5 [PH] (ref 5–7.5)
PLATELET # BLD AUTO: 180 X10E3/UL (ref 150–450)
POTASSIUM SERPL-SCNC: 5.9 MMOL/L (ref 3.5–5.2)
PROT SERPL-MCNC: 7.2 G/DL (ref 6–8.5)
PROT UR QL STRIP: NEGATIVE
PSA SERPL-MCNC: <0.1 NG/ML (ref 0–4)
RBC # BLD AUTO: 4.09 X10E6/UL (ref 4.14–5.8)
SODIUM SERPL-SCNC: 141 MMOL/L (ref 134–144)
SP GR UR STRIP: 1.02 (ref 1–1.03)
TRIGL SERPL-MCNC: 149 MG/DL (ref 0–149)
TSH SERPL DL<=0.005 MIU/L-ACNC: 2.05 UIU/ML (ref 0.45–4.5)
URATE SERPL-MCNC: 8.2 MG/DL (ref 3.8–8.4)
UROBILINOGEN UR STRIP-MCNC: 0.2 MG/DL (ref 0.2–1)
VLDLC SERPL CALC-MCNC: 27 MG/DL (ref 5–40)
WBC # BLD AUTO: 5.8 X10E3/UL (ref 3.4–10.8)

## 2025-08-26 ENCOUNTER — OFFICE VISIT (OUTPATIENT)
Dept: FAMILY MEDICINE CLINIC | Facility: CLINIC | Age: 80
End: 2025-08-26
Payer: MEDICARE

## 2025-08-26 VITALS
OXYGEN SATURATION: 99 % | BODY MASS INDEX: 27.11 KG/M2 | TEMPERATURE: 98.4 F | HEART RATE: 64 BPM | HEIGHT: 69 IN | WEIGHT: 183 LBS | DIASTOLIC BLOOD PRESSURE: 80 MMHG | SYSTOLIC BLOOD PRESSURE: 140 MMHG

## 2025-08-26 DIAGNOSIS — Z85.46 PERSONAL HISTORY OF PROSTATE CANCER: ICD-10-CM

## 2025-08-26 DIAGNOSIS — Z00.00 MEDICARE ANNUAL WELLNESS VISIT, SUBSEQUENT: Primary | ICD-10-CM

## 2025-08-26 DIAGNOSIS — I10 ESSENTIAL HYPERTENSION: ICD-10-CM

## 2025-08-26 DIAGNOSIS — M1A.0710 CHRONIC IDIOPATHIC GOUT INVOLVING TOE OF RIGHT FOOT WITHOUT TOPHUS: ICD-10-CM

## 2025-08-26 PROBLEM — N52.9 ERECTILE DYSFUNCTION: Status: ACTIVE | Noted: 2025-07-17

## 2025-08-26 RX ORDER — MAGNESIUM CARB/ALUMINUM HYDROX 105-160MG
296 TABLET,CHEWABLE ORAL DAILY
COMMUNITY
End: 2025-08-26

## 2025-08-26 RX ORDER — ALLOPURINOL 100 MG/1
100 TABLET ORAL DAILY
Qty: 90 TABLET | Refills: 4 | Status: SHIPPED | OUTPATIENT
Start: 2025-08-26

## 2025-08-26 RX ORDER — TAMSULOSIN HYDROCHLORIDE 0.4 MG/1
1 CAPSULE ORAL DAILY
Qty: 90 CAPSULE | Refills: 3 | Status: SHIPPED | OUTPATIENT
Start: 2025-08-26